# Patient Record
Sex: FEMALE | Race: OTHER | HISPANIC OR LATINO | URBAN - METROPOLITAN AREA
[De-identification: names, ages, dates, MRNs, and addresses within clinical notes are randomized per-mention and may not be internally consistent; named-entity substitution may affect disease eponyms.]

---

## 2017-05-18 ENCOUNTER — INPATIENT (INPATIENT)
Facility: HOSPITAL | Age: 72
LOS: 0 days | Discharge: HOME | DRG: 392 | End: 2017-05-19
Attending: INTERNAL MEDICINE | Admitting: INTERNAL MEDICINE
Payer: MEDICAID

## 2017-05-18 VITALS
SYSTOLIC BLOOD PRESSURE: 165 MMHG | DIASTOLIC BLOOD PRESSURE: 73 MMHG | OXYGEN SATURATION: 99 % | RESPIRATION RATE: 18 BRPM | WEIGHT: 162.04 LBS | HEART RATE: 79 BPM | HEIGHT: 62 IN | TEMPERATURE: 99 F

## 2017-05-18 DIAGNOSIS — R07.89 OTHER CHEST PAIN: ICD-10-CM

## 2017-05-18 DIAGNOSIS — K21.9 GASTRO-ESOPHAGEAL REFLUX DISEASE WITHOUT ESOPHAGITIS: ICD-10-CM

## 2017-05-18 DIAGNOSIS — J98.11 ATELECTASIS: ICD-10-CM

## 2017-05-18 DIAGNOSIS — I10 ESSENTIAL (PRIMARY) HYPERTENSION: ICD-10-CM

## 2017-05-18 DIAGNOSIS — Z95.9 PRESENCE OF CARDIAC AND VASCULAR IMPLANT AND GRAFT, UNSPECIFIED: Chronic | ICD-10-CM

## 2017-05-18 DIAGNOSIS — R06.02 SHORTNESS OF BREATH: ICD-10-CM

## 2017-05-18 DIAGNOSIS — I24.9 ACUTE ISCHEMIC HEART DISEASE, UNSPECIFIED: ICD-10-CM

## 2017-05-18 DIAGNOSIS — Z29.9 ENCOUNTER FOR PROPHYLACTIC MEASURES, UNSPECIFIED: ICD-10-CM

## 2017-05-18 DIAGNOSIS — Z98.890 OTHER SPECIFIED POSTPROCEDURAL STATES: Chronic | ICD-10-CM

## 2017-05-18 LAB
ACETONE SERPL-MCNC: NEGATIVE — SIGNIFICANT CHANGE UP
ALBUMIN SERPL ELPH-MCNC: 3.7 G/DL — SIGNIFICANT CHANGE UP (ref 3.5–5)
ALP SERPL-CCNC: 83 U/L — SIGNIFICANT CHANGE UP (ref 40–120)
ALT FLD-CCNC: 22 U/L DA — SIGNIFICANT CHANGE UP (ref 10–60)
ANION GAP SERPL CALC-SCNC: 8 MMOL/L — SIGNIFICANT CHANGE UP (ref 5–17)
APPEARANCE UR: CLEAR — SIGNIFICANT CHANGE UP
APTT BLD: 32.2 SEC — SIGNIFICANT CHANGE UP (ref 27.5–37.4)
AST SERPL-CCNC: 22 U/L — SIGNIFICANT CHANGE UP (ref 10–40)
BACTERIA # UR AUTO: ABNORMAL /HPF
BASOPHILS # BLD AUTO: 0.2 K/UL — SIGNIFICANT CHANGE UP (ref 0–0.2)
BASOPHILS NFR BLD AUTO: 2.5 % — HIGH (ref 0–2)
BILIRUB SERPL-MCNC: 0.3 MG/DL — SIGNIFICANT CHANGE UP (ref 0.2–1.2)
BILIRUB UR-MCNC: NEGATIVE — SIGNIFICANT CHANGE UP
BUN SERPL-MCNC: 15 MG/DL — SIGNIFICANT CHANGE UP (ref 7–18)
CALCIUM SERPL-MCNC: 8.9 MG/DL — SIGNIFICANT CHANGE UP (ref 8.4–10.5)
CHLORIDE SERPL-SCNC: 105 MMOL/L — SIGNIFICANT CHANGE UP (ref 96–108)
CK MB BLD-MCNC: <0.9 % — SIGNIFICANT CHANGE UP (ref 0–3.5)
CK MB BLD-MCNC: <1.2 % — SIGNIFICANT CHANGE UP (ref 0–3.5)
CK MB CFR SERPL CALC: <1 NG/ML — SIGNIFICANT CHANGE UP (ref 0–3.6)
CK MB CFR SERPL CALC: <1 NG/ML — SIGNIFICANT CHANGE UP (ref 0–3.6)
CK SERPL-CCNC: 114 U/L — SIGNIFICANT CHANGE UP (ref 21–215)
CK SERPL-CCNC: 83 U/L — SIGNIFICANT CHANGE UP (ref 21–215)
CO2 SERPL-SCNC: 27 MMOL/L — SIGNIFICANT CHANGE UP (ref 22–31)
COLOR SPEC: YELLOW — SIGNIFICANT CHANGE UP
CREAT SERPL-MCNC: 0.67 MG/DL — SIGNIFICANT CHANGE UP (ref 0.5–1.3)
D DIMER BLD IA.RAPID-MCNC: 220 NG/ML DDU — SIGNIFICANT CHANGE UP
DIFF PNL FLD: ABNORMAL
EOSINOPHIL # BLD AUTO: 0.2 K/UL — SIGNIFICANT CHANGE UP (ref 0–0.5)
EOSINOPHIL NFR BLD AUTO: 2.6 % — SIGNIFICANT CHANGE UP (ref 0–6)
EPI CELLS # UR: ABNORMAL (ref 0–10)
GLUCOSE SERPL-MCNC: 88 MG/DL — SIGNIFICANT CHANGE UP (ref 70–99)
GLUCOSE UR QL: NEGATIVE — SIGNIFICANT CHANGE UP
HCT VFR BLD CALC: 40 % — SIGNIFICANT CHANGE UP (ref 34.5–45)
HGB BLD-MCNC: 13.5 G/DL — SIGNIFICANT CHANGE UP (ref 11.5–15.5)
INR BLD: 0.9 RATIO — SIGNIFICANT CHANGE UP (ref 0.88–1.16)
KETONES UR-MCNC: NEGATIVE — SIGNIFICANT CHANGE UP
LEUKOCYTE ESTERASE UR-ACNC: NEGATIVE — SIGNIFICANT CHANGE UP
LYMPHOCYTES # BLD AUTO: 3.2 K/UL — SIGNIFICANT CHANGE UP (ref 1–3.3)
LYMPHOCYTES # BLD AUTO: 47.7 % — HIGH (ref 13–44)
MAGNESIUM SERPL-MCNC: 2.3 MG/DL — SIGNIFICANT CHANGE UP (ref 1.6–2.6)
MCHC RBC-ENTMCNC: 31 PG — SIGNIFICANT CHANGE UP (ref 27–34)
MCHC RBC-ENTMCNC: 33.7 GM/DL — SIGNIFICANT CHANGE UP (ref 32–36)
MCV RBC AUTO: 91.9 FL — SIGNIFICANT CHANGE UP (ref 80–100)
MONOCYTES # BLD AUTO: 0.6 K/UL — SIGNIFICANT CHANGE UP (ref 0–0.9)
MONOCYTES NFR BLD AUTO: 9.2 % — SIGNIFICANT CHANGE UP (ref 2–14)
NEUTROPHILS # BLD AUTO: 2.5 K/UL — SIGNIFICANT CHANGE UP (ref 1.8–7.4)
NEUTROPHILS NFR BLD AUTO: 38 % — LOW (ref 43–77)
NITRITE UR-MCNC: NEGATIVE — SIGNIFICANT CHANGE UP
NT-PROBNP SERPL-SCNC: 61 PG/ML — SIGNIFICANT CHANGE UP (ref 0–125)
PH UR: 7 — SIGNIFICANT CHANGE UP (ref 5–8)
PLATELET # BLD AUTO: 317 K/UL — SIGNIFICANT CHANGE UP (ref 150–400)
POTASSIUM SERPL-MCNC: 3.9 MMOL/L — SIGNIFICANT CHANGE UP (ref 3.5–5.3)
POTASSIUM SERPL-SCNC: 3.9 MMOL/L — SIGNIFICANT CHANGE UP (ref 3.5–5.3)
PROT SERPL-MCNC: 7.8 G/DL — SIGNIFICANT CHANGE UP (ref 6–8.3)
PROT UR-MCNC: NEGATIVE — SIGNIFICANT CHANGE UP
PROTHROM AB SERPL-ACNC: 9.8 SEC — SIGNIFICANT CHANGE UP (ref 9.8–12.7)
RBC # BLD: 4.35 M/UL — SIGNIFICANT CHANGE UP (ref 3.8–5.2)
RBC # FLD: 12.3 % — SIGNIFICANT CHANGE UP (ref 10.3–14.5)
RBC CASTS # UR COMP ASSIST: ABNORMAL /HPF (ref 0–2)
SODIUM SERPL-SCNC: 140 MMOL/L — SIGNIFICANT CHANGE UP (ref 135–145)
SP GR SPEC: 1 — LOW (ref 1.01–1.02)
TROPONIN I SERPL-MCNC: <0.015 NG/ML — SIGNIFICANT CHANGE UP (ref 0–0.04)
TROPONIN I SERPL-MCNC: <0.015 NG/ML — SIGNIFICANT CHANGE UP (ref 0–0.04)
UROBILINOGEN FLD QL: NEGATIVE — SIGNIFICANT CHANGE UP
WBC # BLD: 6.7 K/UL — SIGNIFICANT CHANGE UP (ref 3.8–10.5)
WBC # FLD AUTO: 6.7 K/UL — SIGNIFICANT CHANGE UP (ref 3.8–10.5)
WBC UR QL: SIGNIFICANT CHANGE UP /HPF (ref 0–5)

## 2017-05-18 PROCEDURE — 71010: CPT | Mod: 26

## 2017-05-18 PROCEDURE — 99285 EMERGENCY DEPT VISIT HI MDM: CPT

## 2017-05-18 RX ORDER — ASPIRIN/CALCIUM CARB/MAGNESIUM 324 MG
81 TABLET ORAL ONCE
Qty: 0 | Refills: 0 | Status: COMPLETED | OUTPATIENT
Start: 2017-05-18 | End: 2017-05-18

## 2017-05-18 RX ORDER — CLOPIDOGREL BISULFATE 75 MG/1
75 TABLET, FILM COATED ORAL DAILY
Qty: 0 | Refills: 0 | Status: DISCONTINUED | OUTPATIENT
Start: 2017-05-18 | End: 2017-05-19

## 2017-05-18 RX ORDER — METOPROLOL TARTRATE 50 MG
12.5 TABLET ORAL
Qty: 0 | Refills: 0 | Status: DISCONTINUED | OUTPATIENT
Start: 2017-05-18 | End: 2017-05-18

## 2017-05-18 RX ORDER — ASPIRIN/CALCIUM CARB/MAGNESIUM 324 MG
81 TABLET ORAL DAILY
Qty: 0 | Refills: 0 | Status: DISCONTINUED | OUTPATIENT
Start: 2017-05-19 | End: 2017-05-19

## 2017-05-18 RX ORDER — ATORVASTATIN CALCIUM 80 MG/1
40 TABLET, FILM COATED ORAL AT BEDTIME
Qty: 0 | Refills: 0 | Status: DISCONTINUED | OUTPATIENT
Start: 2017-05-18 | End: 2017-05-19

## 2017-05-18 RX ORDER — ENOXAPARIN SODIUM 100 MG/ML
40 INJECTION SUBCUTANEOUS DAILY
Qty: 0 | Refills: 0 | Status: DISCONTINUED | OUTPATIENT
Start: 2017-05-18 | End: 2017-05-19

## 2017-05-18 RX ORDER — LOSARTAN POTASSIUM 100 MG/1
75 TABLET, FILM COATED ORAL DAILY
Qty: 0 | Refills: 0 | Status: DISCONTINUED | OUTPATIENT
Start: 2017-05-18 | End: 2017-05-19

## 2017-05-18 RX ORDER — SODIUM CHLORIDE 9 MG/ML
1000 INJECTION INTRAMUSCULAR; INTRAVENOUS; SUBCUTANEOUS
Qty: 0 | Refills: 0 | Status: DISCONTINUED | OUTPATIENT
Start: 2017-05-18 | End: 2017-05-18

## 2017-05-18 RX ADMIN — Medication 81 MILLIGRAM(S): at 10:54

## 2017-05-18 RX ADMIN — ATORVASTATIN CALCIUM 40 MILLIGRAM(S): 80 TABLET, FILM COATED ORAL at 21:26

## 2017-05-18 RX ADMIN — LOSARTAN POTASSIUM 75 MILLIGRAM(S): 100 TABLET, FILM COATED ORAL at 14:03

## 2017-05-18 RX ADMIN — SODIUM CHLORIDE 125 MILLILITER(S): 9 INJECTION INTRAMUSCULAR; INTRAVENOUS; SUBCUTANEOUS at 10:54

## 2017-05-18 NOTE — H&P ADULT - NEGATIVE CARDIOVASCULAR SYMPTOMS
no claudication/no orthopnea/no paroxysmal nocturnal dyspnea/no peripheral edema/no palpitations/no dyspnea on exertion

## 2017-05-18 NOTE — H&P ADULT - ATTENDING COMMENTS
71 y/o F from home lives with her daughter, walks w/o assistance. PMH of HTN, HLD GERD, diverticulitis and CAD (s/p stent x 1 in LAD). Pt was BIB daughter for difficulty breathing since yesterday with associated nausea, dizziness and mild focal chest discomfort over to mid sternum. Pt has not taken any medications for Sx relief. Pt was enrolled a study trial (with Tacrolimus Eluting Bioresorbable stent placement) 1 year ago, but changed cardiologist recently and is supposed to follow up with new cardiologist next week. Pt was advised to avoid pantoprazole as currently taking Plavix for 1 year. Pt complains of occasional GERD and admitted not compliant with diet recently with 5-10 lbs weight gain over the past month. Pt denies fever, chills, cough, palpitations, vomiting, recent travel, recent sick contacts, or any other complaints. Last stress test 2016 with no signs of ischemia, Echocardiogram 2/2016 showing normal EF and GIDD.     EGD: 1 year ago showing gastritis. Colonoscopy: 1 year ago showing diverticulosis  Family Hx: Brothers HTN.     pt seen in bed, a+o x3, nad, vitals stable, physical exam reveals no focal motor deficit, clear lungs, regular s1s2, abd soft nd nt bs+, ext no edema. labs and diagnostic test result reviewed.    assessment   --- chest pain,  sob, r/o acs, atelectasis, h/o HTN, HLD GERD, diverticulitis and CAD (s/p stent x 1 in LAD)    plan  --  adm to tele, acs protocol, increase cozaar, add lopid, cont preadmit home meds, gi and dvt profilaxis  cbc, bmp, mg, phos, lipid, tsh, ce q8 x3    echo    cardio cons  pulm cons

## 2017-05-18 NOTE — H&P ADULT - PMH
Coronary artery disease involving native coronary artery of native heart without angina pectoris    HTN (hypertension)

## 2017-05-18 NOTE — H&P ADULT - NEGATIVE NEUROLOGICAL SYMPTOMS
no headache/no weakness/no generalized seizures/no facial palsy/no loss of consciousness/no hemiparesis/no loss of sensation/no syncope/no tremors/no paresthesias/no focal seizures/no transient paralysis/no vertigo/no difficulty walking/no confusion

## 2017-05-18 NOTE — H&P ADULT - HISTORY OF PRESENT ILLNESS
73 y/o F from home lives with her daughter, walks w/o assistance. PMH of HTN, HLD GERD, diverticulitis and CAD (s/p stent x 1 in LAD). Pt was BIB daughter for difficulty breathing since yesterday with associated nausea, dizziness and mild focal chest discomfort over to mid sternum. Pt has not taken any medications for Sx relief. Pt was enrolled a study trial (with Tacrolimus Eluting Bioresorbable stent placement) 1 year ago, but changed cardiologist recently and is supposed to follow up with new cardiologist next week. Pt was advised to avoid pantoprazole as currently taking Plavix for 1 year. Pt complains of occasional GERD and admitted not compliant with diet recently with 5-10 lbs weight gain over the past month. Pt denies fever, chills, cough, palpitations, vomiting, recent travel, recent sick contacts, or any other complaints. Last stress test 2016 with no signs of ischemia, Echocardiogram 2/2016 showing normal EF and GIDD.     EGD: 1 year ago showing gastritis. Colonoscopy: 1 year ago showing diverticulosis  Family Hx: Brothers HTN.

## 2017-05-18 NOTE — ED PROVIDER NOTE - OBJECTIVE STATEMENT
71 y/o female with PMHx of HTN and PSHx of Abdominoplasty presents to the ED BIB daughter for difficulty breathing x yesterday with associated nausea, dizziness and mild focal CP to mid sternum. Pt has not taken any medications for Sx relief. Pt denies fever, chills, cough, palpitations, vomiting, recent travel, recent sick contacts, or any other complaints. NKDA. PMD: Dr. Syed 71 y/o female with PMHx of HTN and PSHx of Coronary Stent and Abdominoplasty presents to the ED BIB daughter for difficulty breathing x yesterday with associated nausea, dizziness and mild focal CP to mid sternum. Pt has not taken any medications for Sx relief. Pt denies fever, chills, cough, palpitations, vomiting, recent travel, recent sick contacts, or any other complaints. NKDA. PMD: Dr. Syed

## 2017-05-18 NOTE — H&P ADULT - GASTROINTESTINAL DETAILS
no guarding/no rigidity/nontender/no distention/no masses palpable/no rebound tenderness/normal/no organomegaly/bowel sounds normal/soft/no bruit

## 2017-05-18 NOTE — H&P ADULT - NEGATIVE ENMT SYMPTOMS
no abnormal taste sensation/no ear pain/no nose bleeds/no nasal obstruction/no post-nasal discharge/no gum bleeding/no sinus symptoms/no throat pain/no hearing difficulty/no nasal discharge/no dry mouth/no dysphagia/no vertigo/no nasal congestion/no tinnitus/no recurrent cold sores

## 2017-05-18 NOTE — H&P ADULT - PROBLEM SELECTOR PLAN 2
pt has hx of GERD dx 1 year ago   currently diet controlled only as pt was told by cardiologist not to take PPI as pt taking Plavix  c/w Maalox PRN for dyspepsia

## 2017-05-18 NOTE — H&P ADULT - NEGATIVE SKIN SYMPTOMS
no dryness/no rash/no brittle nails/no tumor/no change in size/color of mole/no hair loss/no pitted nails/no itching

## 2017-05-18 NOTE — ED PROVIDER NOTE - PROGRESS NOTE DETAILS
pt's daughter later claims pt has h/o cardiac stent placement, since with sob, will admit pt, d/w Dr. Wynn

## 2017-05-18 NOTE — H&P ADULT - RS GEN PE MLT RESP DETAILS PC
respirations non-labored/no subcutaneous emphysema/good air movement/breath sounds equal/no chest wall tenderness/clear to auscultation bilaterally/no rales/normal/no rhonchi/no intercostal retractions/no wheezes/airway patent

## 2017-05-18 NOTE — ED PROVIDER NOTE - NS ED MD SCRIBE ATTENDING SCRIBE SECTIONS
PAST MEDICAL/SURGICAL/SOCIAL HISTORY/HIV/PHYSICAL EXAM/HISTORY OF PRESENT ILLNESS/VITAL SIGNS( Pullset)/REVIEW OF SYSTEMS/DISPOSITION

## 2017-05-18 NOTE — CONSULT NOTE ADULT - SUBJECTIVE AND OBJECTIVE BOX
PULMONARY CONSULT NOTE      RAHUL MOLINA  [id number]    Patient is a 72y old  Female who presents with a chief complaint of     HISTORY OF PRESENT ILLNESS:    MEDICATIONS  (STANDING):  sodium chloride 0.9%. 1000milliLiter(s) IV Continuous <Continuous>  atorvastatin 40milliGRAM(s) Oral at bedtime  metoprolol 12.5milliGRAM(s) Oral two times a day      MEDICATIONS  (PRN):      Allergies    No Known Allergies    Intolerances        PAST MEDICAL & SURGICAL HISTORY:  HTN (hypertension)  Status post arterial stent  H/O abdominoplasty      FAMILY HISTORY:  No pertinent family history in first degree relatives      SOCIAL HISTORY  Smoking History:     REVIEW OF SYSTEMS:    CONSTITUTIONAL:  No fevers, chills, sweats    HEENT:  Eyes:  No diplopia or blurred vision. ENT:  No earache, sore throat or runny nose.    CARDIOVASCULAR:  No pressure, squeezing, tightness, or heaviness about the chest; no palpitations.    RESPIRATORY:  Per HPI    GASTROINTESTINAL:  No abdominal pain, nausea, vomiting or diarrhea.    GENITOURINARY:  No dysuria, frequency or urgency.    NEUROLOGIC:  No paresthesias, fasciculations, seizures or weakness.    PSYCHIATRIC:  No disorder of thought or mood.    Vital Signs Last 24 Hrs  T(C): 37, Max: 37 (05-18 @ 10:05)  T(F): 98.6, Max: 98.6 (05-18 @ 10:05)  HR: 79 (79 - 79)  BP: 165/73 (165/73 - 165/73)  BP(mean): --  RR: 18 (18 - 18)  SpO2: 99% (99% - 99%)  I&O's Detail      PHYSICAL EXAMINATION:    GENERAL: The patient is a well-developed, well-nourished _____in no apparent distress.     HEENT: Head is normocephalic and atraumatic. Extraocular muscles are intact. Mucous membranes are moist.     NECK: Supple.     LUNGS: Clear to auscultation without wheezing, rales, or rhonchi. Respirations unlabored    HEART: Regular rate and rhythm without murmur.    ABDOMEN: Soft, nontender, and nondistended.  No hepatosplenomegaly is noted.    EXTREMITIES: Without any cyanosis, clubbing, rash, lesions or edema.    NEUROLOGIC: Grossly intact.      LABS:                        13.5   6.7   )-----------( 317      ( 18 May 2017 10:53 )             40.0         140  |  105  |  15  ----------------------------<  88  3.9   |  27  |  0.67    Ca    8.9      18 May 2017 10:53  Mg     2.3         TPro  7.8  /  Alb  3.7  /  TBili  0.3  /  DBili  x   /  AST  22  /  ALT  22  /  AlkPhos  83      PT/INR - ( 18 May 2017 10:53 )   PT: 9.8 sec;   INR: 0.90 ratio         PTT - ( 18 May 2017 10:53 )  PTT:32.2 sec  Urinalysis Basic - ( 18 May 2017 10:53 )    Color: Yellow / Appearance: Clear / S.005 / pH: x  Gluc: x / Ketone: Negative  / Bili: Negative / Urobili: Negative   Blood: x / Protein: Negative / Nitrite: Negative   Leuk Esterase: Negative / RBC: 5-10 /HPF / WBC 0-2 /HPF   Sq Epi: x / Non Sq Epi: Few / Bacteria: Trace /HPF        CARDIAC MARKERS ( 18 May 2017 10:53 )  <0.015 ng/mL / x     / 83 U/L / x     / <1.0 ng/mL        Serum Pro-Brain Natriuretic Peptide: 61 pg/mL ( @ 10:53)          MICROBIOLOGY:    RADIOLOGY & ADDITIONAL STUDIES:    CXR:  IMPRESSION:  Atelectasis right lung base. No consolidation or pleural effusion.    Heart size cannot be accurately assessed in this projection.    ELADIO BUCHANAN M.D., ATTENDING RADIOLOGIST  This document has been electronically signed. May 18 2017 11:39AM PULMONARY CONSULT NOTE      RAHUL MOLINA  [id number]    Patient is a 72y old  Female who presents with a chief complaint of chest tightnesss associated with sob. Denies cough or wheezing.  Denies Asthma History.    HISTORY OF PRESENT ILLNESS:    MEDICATIONS  (STANDING):  sodium chloride 0.9%. 1000milliLiter(s) IV Continuous <Continuous>  atorvastatin 40milliGRAM(s) Oral at bedtime  metoprolol 12.5milliGRAM(s) Oral two times a day      MEDICATIONS  (PRN):      Allergies    No Known Allergies    Intolerances        PAST MEDICAL & SURGICAL HISTORY:  HTN (hypertension)  Hyperlipidemia  Status post arterial stent  H/O abdominoplasty      FAMILY HISTORY:  No pertinent family history in first degree relatives      SOCIAL HISTORY  Smoking History: former smoker    REVIEW OF SYSTEMS:    CONSTITUTIONAL:  No fevers, chills, sweats    HEENT:  Eyes:  No diplopia or blurred vision. ENT:  No earache, sore throat or runny nose.    CARDIOVASCULAR:  chest tightness    RESPIRATORY: sob    GASTROINTESTINAL:  No abdominal pain, nausea, vomiting or diarrhea.    GENITOURINARY:  No dysuria, frequency or urgency.    NEUROLOGIC:  No paresthesias, fasciculations, seizures or weakness.    PSYCHIATRIC:  No disorder of thought or mood.    Vital Signs Last 24 Hrs  T(C): 37, Max: 37 (05-18 @ 10:05)  T(F): 98.6, Max: 98.6 (05-18 @ 10:05)  HR: 79 (79 - 79)  BP: 165/73 (165/73 - 165/73)  BP(mean): --  RR: 18 (18 - 18)  SpO2: 99% (99% - 99%)  I&O's Detail      PHYSICAL EXAMINATION:    GENERAL: The patient is a well-developed, well-nourished _____in no apparent distress.     HEENT: Head is normocephalic and atraumatic. Extraocular muscles are intact. Mucous membranes are moist.     NECK: Supple.     LUNGS: Clear to auscultation without wheezing, rales, or rhonchi. Respirations unlabored    HEART: Regular rate and rhythm without murmur.    ABDOMEN: Soft, nontender, and nondistended.  No hepatosplenomegaly is noted.    EXTREMITIES: Without any cyanosis, clubbing, rash, lesions or edema.    NEUROLOGIC: Grossly intact.      LABS:                        13.5   6.7   )-----------( 317      ( 18 May 2017 10:53 )             40.0         140  |  105  |  15  ----------------------------<  88  3.9   |  27  |  0.67    Ca    8.9      18 May 2017 10:53  Mg     2.3         TPro  7.8  /  Alb  3.7  /  TBili  0.3  /  DBili  x   /  AST  22  /  ALT  22  /  AlkPhos  83      PT/INR - ( 18 May 2017 10:53 )   PT: 9.8 sec;   INR: 0.90 ratio         PTT - ( 18 May 2017 10:53 )  PTT:32.2 sec  Urinalysis Basic - ( 18 May 2017 10:53 )    Color: Yellow / Appearance: Clear / S.005 / pH: x  Gluc: x / Ketone: Negative  / Bili: Negative / Urobili: Negative   Blood: x / Protein: Negative / Nitrite: Negative   Leuk Esterase: Negative / RBC: 5-10 /HPF / WBC 0-2 /HPF   Sq Epi: x / Non Sq Epi: Few / Bacteria: Trace /HPF        CARDIAC MARKERS ( 18 May 2017 10:53 )  <0.015 ng/mL / x     / 83 U/L / x     / <1.0 ng/mL        Serum Pro-Brain Natriuretic Peptide: 61 pg/mL ( @ 10:53)          MICROBIOLOGY:    RADIOLOGY & ADDITIONAL STUDIES:    CXR:  IMPRESSION:  Atelectasis right lung base. No consolidation or pleural effusion.    Heart size cannot be accurately assessed in this projection.    ELADIO BUCHANAN M.D., ATTENDING RADIOLOGIST  This document has been electronically signed. May 18 2017 11:39AM

## 2017-05-18 NOTE — H&P ADULT - NEGATIVE MUSCULOSKELETAL SYMPTOMS
no arthralgia/no neck pain/no arthritis/no muscle cramps/no stiffness/no joint swelling/no muscle weakness/no myalgia

## 2017-05-18 NOTE — H&P ADULT - NEGATIVE GENERAL SYMPTOMS
no fever/no chills/no sweating/no weight loss/no polydipsia/no polyphagia/no anorexia/no malaise/no fatigue/no polyuria

## 2017-05-18 NOTE — H&P ADULT - ASSESSMENT
73 y/o F from home lives with her daughter, walks w/o assistance. PMH of HTN, HLD GERD, diverticulitis and CAD (s/p stent x 1 in LAD). Pt was BIB daughter for difficulty breathing since yesterday with associated nausea, dizziness and mild focal chest discomfort over to mid sternum. Pt has not taken any medications for Sx relief. Pt was enrolled a study trial (with Tacrolimus Eluting Bioresorbable stent placement) 1 year ago, but changed cardiologist recently and is supposed to follow up with new cardiologist next week. Pt was advised to avoid pantoprazole as currently taking Plavix for 1 year. Pt complains of occasional GERD and admitted not compliant with diet recently with 5-10 lbs weight gain over the past month.

## 2017-05-18 NOTE — H&P ADULT - PROBLEM SELECTOR PLAN 1
patient presented with atypical chest pain over the past day, with prior hx of CAD  on admission chest pain not reproducible   currently taking ASA, Plavix and statin, not taking BB as per pt had side effect of depression (outopt cardiologist discontinued)  c/w ASA, statin and plavix  f/u serial cardiac enzymes  f/u echocardiogram   Cardiology consulted Dr. Ortega

## 2017-05-18 NOTE — H&P ADULT - NEGATIVE GASTROINTESTINAL SYMPTOMS
no change in bowel habits/no flatulence/no nausea/no steatorrhea/no constipation/no hematochezia/no hiccoughs/no diarrhea/no vomiting/no jaundice/no melena

## 2017-05-18 NOTE — H&P ADULT - NEUROLOGICAL DETAILS
alert and oriented x 3/responds to verbal commands/responds to pain/deep reflexes intact/sensation intact/no spontaneous movement/normal strength/cranial nerves intact/superficial reflexes intact

## 2017-05-19 VITALS
OXYGEN SATURATION: 97 % | DIASTOLIC BLOOD PRESSURE: 76 MMHG | SYSTOLIC BLOOD PRESSURE: 145 MMHG | RESPIRATION RATE: 17 BRPM | HEART RATE: 62 BPM | TEMPERATURE: 99 F

## 2017-05-19 LAB
ALBUMIN SERPL ELPH-MCNC: 3.4 G/DL — LOW (ref 3.5–5)
ALP SERPL-CCNC: 73 U/L — SIGNIFICANT CHANGE UP (ref 40–120)
ALT FLD-CCNC: 20 U/L DA — SIGNIFICANT CHANGE UP (ref 10–60)
ANION GAP SERPL CALC-SCNC: 13 MMOL/L — SIGNIFICANT CHANGE UP (ref 5–17)
AST SERPL-CCNC: 20 U/L — SIGNIFICANT CHANGE UP (ref 10–40)
BASOPHILS # BLD AUTO: 0.1 K/UL — SIGNIFICANT CHANGE UP (ref 0–0.2)
BASOPHILS NFR BLD AUTO: 2.3 % — HIGH (ref 0–2)
BILIRUB SERPL-MCNC: 0.4 MG/DL — SIGNIFICANT CHANGE UP (ref 0.2–1.2)
BUN SERPL-MCNC: 13 MG/DL — SIGNIFICANT CHANGE UP (ref 7–18)
CALCIUM SERPL-MCNC: 8.9 MG/DL — SIGNIFICANT CHANGE UP (ref 8.4–10.5)
CHLORIDE SERPL-SCNC: 108 MMOL/L — SIGNIFICANT CHANGE UP (ref 96–108)
CHOLEST SERPL-MCNC: 217 MG/DL — HIGH (ref 10–199)
CK MB BLD-MCNC: <1.2 % — SIGNIFICANT CHANGE UP (ref 0–3.5)
CK MB CFR SERPL CALC: <1 NG/ML — SIGNIFICANT CHANGE UP (ref 0–3.6)
CK SERPL-CCNC: 83 U/L — SIGNIFICANT CHANGE UP (ref 21–215)
CO2 SERPL-SCNC: 22 MMOL/L — SIGNIFICANT CHANGE UP (ref 22–31)
CREAT SERPL-MCNC: 0.61 MG/DL — SIGNIFICANT CHANGE UP (ref 0.5–1.3)
CULTURE RESULTS: NO GROWTH — SIGNIFICANT CHANGE UP
EOSINOPHIL # BLD AUTO: 0.2 K/UL — SIGNIFICANT CHANGE UP (ref 0–0.5)
EOSINOPHIL NFR BLD AUTO: 3.6 % — SIGNIFICANT CHANGE UP (ref 0–6)
GLUCOSE SERPL-MCNC: 100 MG/DL — HIGH (ref 70–99)
HBA1C BLD-MCNC: 5.9 % — HIGH (ref 4–5.6)
HCT VFR BLD CALC: 40.3 % — SIGNIFICANT CHANGE UP (ref 34.5–45)
HDLC SERPL-MCNC: 44 MG/DL — SIGNIFICANT CHANGE UP (ref 40–125)
HGB BLD-MCNC: 13.2 G/DL — SIGNIFICANT CHANGE UP (ref 11.5–15.5)
LIPID PNL WITH DIRECT LDL SERPL: 125 MG/DL — SIGNIFICANT CHANGE UP
LYMPHOCYTES # BLD AUTO: 2.6 K/UL — SIGNIFICANT CHANGE UP (ref 1–3.3)
LYMPHOCYTES # BLD AUTO: 42.7 % — SIGNIFICANT CHANGE UP (ref 13–44)
MAGNESIUM SERPL-MCNC: 2.2 MG/DL — SIGNIFICANT CHANGE UP (ref 1.6–2.6)
MCHC RBC-ENTMCNC: 29.8 PG — SIGNIFICANT CHANGE UP (ref 27–34)
MCHC RBC-ENTMCNC: 32.8 GM/DL — SIGNIFICANT CHANGE UP (ref 32–36)
MCV RBC AUTO: 91 FL — SIGNIFICANT CHANGE UP (ref 80–100)
MONOCYTES # BLD AUTO: 0.5 K/UL — SIGNIFICANT CHANGE UP (ref 0–0.9)
MONOCYTES NFR BLD AUTO: 8.5 % — SIGNIFICANT CHANGE UP (ref 2–14)
NEUTROPHILS # BLD AUTO: 2.6 K/UL — SIGNIFICANT CHANGE UP (ref 1.8–7.4)
NEUTROPHILS NFR BLD AUTO: 42.9 % — LOW (ref 43–77)
PHOSPHATE SERPL-MCNC: 3.6 MG/DL — SIGNIFICANT CHANGE UP (ref 2.5–4.5)
PLATELET # BLD AUTO: 309 K/UL — SIGNIFICANT CHANGE UP (ref 150–400)
POTASSIUM SERPL-MCNC: 4 MMOL/L — SIGNIFICANT CHANGE UP (ref 3.5–5.3)
POTASSIUM SERPL-SCNC: 4 MMOL/L — SIGNIFICANT CHANGE UP (ref 3.5–5.3)
PROT SERPL-MCNC: 7.5 G/DL — SIGNIFICANT CHANGE UP (ref 6–8.3)
RBC # BLD: 4.42 M/UL — SIGNIFICANT CHANGE UP (ref 3.8–5.2)
RBC # FLD: 11.9 % — SIGNIFICANT CHANGE UP (ref 10.3–14.5)
SODIUM SERPL-SCNC: 143 MMOL/L — SIGNIFICANT CHANGE UP (ref 135–145)
SPECIMEN SOURCE: SIGNIFICANT CHANGE UP
TOTAL CHOLESTEROL/HDL RATIO MEASUREMENT: 4.9 RATIO — SIGNIFICANT CHANGE UP (ref 3.3–7.1)
TRIGL SERPL-MCNC: 240 MG/DL — HIGH (ref 10–149)
TROPONIN I SERPL-MCNC: <0.015 NG/ML — SIGNIFICANT CHANGE UP (ref 0–0.04)
WBC # BLD: 6.1 K/UL — SIGNIFICANT CHANGE UP (ref 3.8–10.5)
WBC # FLD AUTO: 6.1 K/UL — SIGNIFICANT CHANGE UP (ref 3.8–10.5)

## 2017-05-19 RX ORDER — ATORVASTATIN CALCIUM 80 MG/1
1 TABLET, FILM COATED ORAL
Qty: 30 | Refills: 0
Start: 2017-05-19 | End: 2017-06-18

## 2017-05-19 RX ORDER — ACETAMINOPHEN 500 MG
650 TABLET ORAL ONCE
Qty: 0 | Refills: 0 | Status: COMPLETED | OUTPATIENT
Start: 2017-05-19 | End: 2017-05-19

## 2017-05-19 RX ORDER — METOPROLOL TARTRATE 50 MG
12.5 TABLET ORAL DAILY
Qty: 0 | Refills: 0 | Status: DISCONTINUED | OUTPATIENT
Start: 2017-05-19 | End: 2017-05-19

## 2017-05-19 RX ORDER — ATORVASTATIN CALCIUM 80 MG/1
10 TABLET, FILM COATED ORAL AT BEDTIME
Qty: 0 | Refills: 0 | Status: DISCONTINUED | OUTPATIENT
Start: 2017-05-19 | End: 2017-05-19

## 2017-05-19 RX ORDER — GEMFIBROZIL 600 MG
1 TABLET ORAL
Qty: 60 | Refills: 0
Start: 2017-05-19 | End: 2017-06-18

## 2017-05-19 RX ORDER — ASPIRIN/CALCIUM CARB/MAGNESIUM 324 MG
1 TABLET ORAL
Qty: 0 | Refills: 0 | COMMUNITY

## 2017-05-19 RX ORDER — LOSARTAN POTASSIUM 100 MG/1
1.5 TABLET, FILM COATED ORAL
Qty: 0 | Refills: 0 | COMMUNITY

## 2017-05-19 RX ORDER — CLOPIDOGREL BISULFATE 75 MG/1
1 TABLET, FILM COATED ORAL
Qty: 30 | Refills: 0
Start: 2017-05-19 | End: 2017-06-18

## 2017-05-19 RX ORDER — GEMFIBROZIL 600 MG
600 TABLET ORAL
Qty: 0 | Refills: 0 | Status: DISCONTINUED | OUTPATIENT
Start: 2017-05-19 | End: 2017-05-19

## 2017-05-19 RX ORDER — METOPROLOL TARTRATE 50 MG
0.5 TABLET ORAL
Qty: 15 | Refills: 0
Start: 2017-05-19 | End: 2017-06-18

## 2017-05-19 RX ORDER — LOSARTAN POTASSIUM 100 MG/1
1 TABLET, FILM COATED ORAL
Qty: 60 | Refills: 0
Start: 2017-05-19 | End: 2017-06-18

## 2017-05-19 RX ORDER — CLOPIDOGREL BISULFATE 75 MG/1
1 TABLET, FILM COATED ORAL
Qty: 0 | Refills: 0 | COMMUNITY

## 2017-05-19 RX ORDER — ASPIRIN/CALCIUM CARB/MAGNESIUM 324 MG
1 TABLET ORAL
Qty: 30 | Refills: 0 | OUTPATIENT
Start: 2017-05-19 | End: 2017-06-18

## 2017-05-19 RX ORDER — LOSARTAN POTASSIUM 100 MG/1
50 TABLET, FILM COATED ORAL
Qty: 0 | Refills: 0 | Status: DISCONTINUED | OUTPATIENT
Start: 2017-05-20 | End: 2017-05-19

## 2017-05-19 RX ADMIN — Medication 650 MILLIGRAM(S): at 08:08

## 2017-05-19 RX ADMIN — Medication 650 MILLIGRAM(S): at 07:41

## 2017-05-19 RX ADMIN — ENOXAPARIN SODIUM 40 MILLIGRAM(S): 100 INJECTION SUBCUTANEOUS at 11:57

## 2017-05-19 RX ADMIN — CLOPIDOGREL BISULFATE 75 MILLIGRAM(S): 75 TABLET, FILM COATED ORAL at 11:57

## 2017-05-19 RX ADMIN — Medication 81 MILLIGRAM(S): at 11:57

## 2017-05-19 RX ADMIN — LOSARTAN POTASSIUM 75 MILLIGRAM(S): 100 TABLET, FILM COATED ORAL at 06:14

## 2017-05-19 NOTE — DISCHARGE NOTE ADULT - MEDICATION SUMMARY - MEDICATIONS TO STOP TAKING
I will STOP taking the medications listed below when I get home from the hospital:    Cipro 500 mg oral tablet  -- 1 tab(s) by mouth 2 times a day  -- Avoid prolonged or excessive exposure to direct and/or artificial sunlight while taking this medication.  Check with your doctor before becoming pregnant.  Do not take dairy products, antacids, or iron preparations within one hour of this medication.  Finish all this medication unless otherwise directed by prescriber.  Medication should be taken with plenty of water.    Flagyl 500 mg oral tablet  -- 1 tab(s) by mouth every 8 hours  -- Do not drink alcoholic beverages when taking this medication.  Finish all this medication unless otherwise directed by prescriber.  May discolor urine or feces.

## 2017-05-19 NOTE — PROGRESS NOTE ADULT - PROBLEM SELECTOR PLAN 2
pt has hx of GERD dx 1 year ago   currently diet controlled only as pt was told by cardiologist not to take PPI as pt taking Plavix  c/w Maalox PRN for dyspepsia
Incentive spirometry  Bronchodilators prn  [facility] as outpatient

## 2017-05-19 NOTE — CONSULT NOTE ADULT - ASSESSMENT
Pt with Hx as above with Chest pain,SOB.  1.Echocardiogram.  2.Stress test-r/o ischemia.  3.Add lopid 600mg bid  4. Increase cozaar 50mg bid.  5.Continue rest of cardiac medication.  6. GI and DVT prophylaxis. Pt with Hx as above with Chest pain,SOB, bradycardia.  1.Echocardiogram.  2.Stress test-r/o ischemia.  3.Add lopid 600mg bid and Toprol XL 12.5mg qd  4. Increase cozaar 50mg bid.  5. Tele monitoring for HR.  6. GI and DVT prophylaxis.

## 2017-05-19 NOTE — PROGRESS NOTE ADULT - PROBLEM SELECTOR PLAN 3
Blood pressure is elevated  -Losartan changed to 50mg BID from 75mg Daily
Oxygen supp prn  Cont meds  Cardiac follow up

## 2017-05-19 NOTE — DISCHARGE NOTE ADULT - CARE PLAN
Principal Discharge DX:	ACS (acute coronary syndrome)  Goal:	Resolved  Instructions for follow-up, activity and diet:	You were evaluated for cardiac chest pain. All blood work and testing indicated that your heart is working well currently and you do not have any coronary artery disease. Your chest pain is likely secondary to gastric reflux disease and you should take Maalox after meals to relieve the discomfort  Secondary Diagnosis:	Essential hypertension  Goal:	Control blood pressure  Instructions for follow-up, activity and diet:	Your blood pressure was poorly controlled on admission and your medications were adjusted to improve blood pressure control. Your losartan is changed from 75mg once a day to 50mg twice a day and you were started on Toprol to assist in blood pressure control. Follow up with your PCP for blood pressure check in 1 week and evaluate medication regimen for possible improvements and optimizations.  Secondary Diagnosis:	Gastroesophageal reflux disease, esophagitis presence not specified  Goal:	Control gastric reflux  Instructions for follow-up, activity and diet:	Please take Maalox after every meal to help with gastric reflux to prevent chest pain in the future. Please tell your PCP about your new diagnosis and ask for any further medication recommendations.

## 2017-05-19 NOTE — DISCHARGE NOTE ADULT - HOSPITAL COURSE
71 y/o F from home lives with her daughter, walks w/o assistance. PMH of HTN, HLD GERD, diverticulitis and CAD (s/p stent x 1 in LAD). Pt was BIB daughter for difficulty breathing since yesterday with associated nausea, dizziness and mild focal chest discomfort over to mid sternum. Pt has not taken any medications for Sx relief. Pt was enrolled a study trial (with Tacrolimus Eluting Bioresorbable stent placement) 1 year ago, but changed cardiologist recently and is supposed to follow up with new cardiologist next week. Pt was advised to avoid pantoprazole as currently taking Plavix for 1 year. Pt complains of occasional GERD and admitted not compliant with diet recently with 5-10 lbs weight gain over the past month. Pt denies fever, chills, cough, palpitations, vomiting, recent travel, recent sick contacts, or any other complaints. Last stress test 2016 with no signs of ischemia, Echocardiogram 2/2016 showing normal EF and GIDD.     EGD: 1 year ago showing gastritis. Colonoscopy: 1 year ago showing diverticulosis  Family Hx: Brothers HTN. (18 May 2017 14:10)    In the hospital, patient was evaluated for cardiac chest pain, rule out acute coronary syndrome. Patient was tested and had negative cardiac enzymes x3 repeats, non diabetic as per A1c testing and normal TSH and LDL was high. Patient was tested for cardiac function with an echocardiogram which showed normal ejection fraction and a nuclear stress test which showed no evidence of coronary artery ischemia. Patient's chest pain improved in house with maalox. Patient is medically stable and will be discharged with outpatient follow up. Plan discussed and agreed with Dr. Butterfield

## 2017-05-19 NOTE — PROGRESS NOTE ADULT - PROBLEM SELECTOR PLAN 1
Chest pain has improved overnight  -Cardiac enzymes are negative x3  -, A1c 5.9, TSH pending  -C/w ASA, lipitor, plavix, metoprolol  -F/u Echocardiogram/NST
Cont meds  Cardiac follow up  Echo

## 2017-05-19 NOTE — DISCHARGE NOTE ADULT - MEDICATION SUMMARY - MEDICATIONS TO CHANGE
I will SWITCH the dose or number of times a day I take the medications listed below when I get home from the hospital:    losartan 50 mg oral tablet  -- 1.5 tab(s) by mouth once a day

## 2017-05-19 NOTE — PROGRESS NOTE ADULT - SUBJECTIVE AND OBJECTIVE BOX
Patient is a 72y old  Female who presents with a chief complaint of chest pain  HPI:  71 y/o F from home lives with her daughter, walks w/o assistance. PMH of HTN, HLD GERD, diverticulitis and CAD (s/p stent x 1 in LAD). Pt was BIB daughter for difficulty breathing since yesterday with associated nausea, dizziness and mild focal chest discomfort over to mid sternum. Pt has not taken any medications for Sx relief. Pt was enrolled a study trial (with Tacrolimus Eluting Bioresorbable stent placement) 1 year ago, but changed cardiologist recently and is supposed to follow up with new cardiologist next week. Pt was advised to avoid pantoprazole as currently taking Plavix for 1 year. Pt complains of occasional GERD and admitted not compliant with diet recently with 5-10 lbs weight gain over the past month. Pt denies fever, chills, cough, palpitations, vomiting, recent travel, recent sick contacts, or any other complaints. Last stress test 2016 with no signs of ischemia, Echocardiogram 2/2016 showing normal EF and GIDD.     EGD: 1 year ago showing gastritis. Colonoscopy: 1 year ago showing diverticulosis  Family Hx: Brothers HTN. (18 May 2017 14:10)    Overnight Events: chest pain improved, had nausea this morning which self-resolved    MEDICATIONS  (STANDING):  aspirin enteric coated 81milliGRAM(s) Oral daily  clopidogrel Tablet 75milliGRAM(s) Oral daily  enoxaparin Injectable 40milliGRAM(s) SubCutaneous daily  atorvastatin 10milliGRAM(s) Oral at bedtime  gemfibrozil 600milliGRAM(s) Oral two times a day before meals  metoprolol succinate ER 12.5milliGRAM(s) Oral daily    MEDICATIONS  (PRN):  aluminum hydroxide/magnesium hydroxide/simethicone Suspension 30milliLiter(s) Oral every 6 hours PRN Dyspepsia    Vital Signs Last 24 Hrs  T(C): 36.6, Max: 36.7 (05-18 @ 13:47)  T(F): 97.8, Max: 98.1 (05-18 @ 15:54)  HR: 66 (56 - 66)  BP: 141/63 (139/67 - 171/69)  BP(mean): --  RR: 18 (16 - 18)  SpO2: 98% (97% - 100%)    General: WN/WD NAD  Neurology: A&Ox3  Respiratory: CTA B/L, no wheezes, no rhonchi, no rales  CV: RRR, S1S2, no murmur  Abdominal: Soft, NT, ND no palpable mass, bowel sounds positive  MSK: No edema, + peripheral pulses      Labs:                        13.2   6.1   )-----------( 309      ( 19 May 2017 06:05 )             40.3     05-19    143  |  108  |  13  ----------------------------<  100<H>  4.0   |  22  |  0.61    Ca    8.9      19 May 2017 06:05  Phos  3.6     05-19  Mg     2.2     05-19    TPro  7.5  /  Alb  3.4<L>  /  TBili  0.4  /  DBili  x   /  AST  20  /  ALT  20  /  AlkPhos  73  05-19 05-19 Chol 217<H>  HDL 44 Trig 240<H>

## 2017-05-19 NOTE — DISCHARGE NOTE ADULT - PLAN OF CARE
Resolved You were evaluated for cardiac chest pain. All blood work and testing indicated that your heart is working well currently and you do not have any coronary artery disease. Your chest pain is likely secondary to gastric reflux disease and you should take Maalox after meals to relieve the discomfort Control blood pressure Your blood pressure was poorly controlled on admission and your medications were adjusted to improve blood pressure control. Your losartan is changed from 75mg once a day to 50mg twice a day and you were started on Toprol to assist in blood pressure control. Follow up with your PCP for blood pressure check in 1 week and evaluate medication regimen for possible improvements and optimizations. Control gastric reflux Please take Maalox after every meal to help with gastric reflux to prevent chest pain in the future. Please tell your PCP about your new diagnosis and ask for any further medication recommendations.

## 2017-05-19 NOTE — PROGRESS NOTE ADULT - ASSESSMENT
71 y/o F from home lives with her daughter, walks w/o assistance. PMH of HTN, HLD GERD, diverticulitis and CAD (s/p stent x 1 in LAD). Pt was BIB daughter for difficulty breathing since yesterday with associated nausea, dizziness and mild focal chest discomfort over to mid sternum. Pt has not taken any medications for Sx relief. Pt was enrolled a study trial (with Tacrolimus Eluting Bioresorbable stent placement) 1 year ago, but changed cardiologist recently and is supposed to follow up with new cardiologist next week. Pt was advised to avoid pantoprazole as currently taking Plavix for 1 year. Pt complains of occasional GERD and admitted not compliant with diet recently with 5-10 lbs weight gain over the past month.

## 2017-05-19 NOTE — PROGRESS NOTE ADULT - SUBJECTIVE AND OBJECTIVE BOX
Patient is a 72y old  Female who presents with a chief complaint of     INTERVAL HPI/OVERNIGHT EVENTS:      VITAL SIGNS:  T(F): 97.8  HR: 66  BP: 141/63  RR: 18  SpO2: 98%  Wt(kg): --  I&O's Detail          REVIEW OF SYSTEMS:    CONSTITUTIONAL:  No fevers, chills, sweats    HEENT:  Eyes:  No diplopia or blurred vision. ENT:  No earache, sore throat or runny nose.    CARDIOVASCULAR:  No pressure, squeezing, tightness, or heaviness about the chest; no palpitations.    RESPIRATORY:  Per HPI    GASTROINTESTINAL:  No abdominal pain, nausea, vomiting or diarrhea.    GENITOURINARY:  No dysuria, frequency or urgency.    NEUROLOGIC:  No paresthesias, fasciculations, seizures or weakness.    PSYCHIATRIC:  No disorder of thought or mood.      PHYSICAL EXAM:    Constitutional: Well developed and nourished  Eyes:Perrla  ENMT: normal  Neck:supple  Respiratory: good air entry  Cardiovascular: S1 S2 regular  Gastrointestinal: Soft, Non tender  Extremities: No edema  Vascular:normal  Neurological:Awake, alert,Ox3  Musculoskeletal:Normal      MEDICATIONS  (STANDING):  aspirin enteric coated 81milliGRAM(s) Oral daily  clopidogrel Tablet 75milliGRAM(s) Oral daily  enoxaparin Injectable 40milliGRAM(s) SubCutaneous daily  atorvastatin 10milliGRAM(s) Oral at bedtime  gemfibrozil 600milliGRAM(s) Oral two times a day before meals  metoprolol succinate ER 12.5milliGRAM(s) Oral daily    MEDICATIONS  (PRN):  aluminum hydroxide/magnesium hydroxide/simethicone Suspension 30milliLiter(s) Oral every 6 hours PRN Dyspepsia      Allergies    No Known Allergies    Intolerances        LABS:                        13.2   6.1   )-----------( 309      ( 19 May 2017 06:05 )             40.3     05-19    143  |  108  |  13  ----------------------------<  100<H>  4.0   |  22  |  0.61    Ca    8.9      19 May 2017 06:05  Phos  3.6     05-19  Mg     2.2     05-19    TPro  7.5  /  Alb  3.4<L>  /  TBili  0.4  /  DBili  x   /  AST  20  /  ALT  20  /  AlkPhos  73  05-19    PT/INR - ( 18 May 2017 10:53 )   PT: 9.8 sec;   INR: 0.90 ratio         PTT - ( 18 May 2017 10:53 )  PTT:32.2 sec  Urinalysis Basic - ( 18 May 2017 10:53 )    Color: Yellow / Appearance: Clear / S.005 / pH: x  Gluc: x / Ketone: Negative  / Bili: Negative / Urobili: Negative   Blood: x / Protein: Negative / Nitrite: Negative   Leuk Esterase: Negative / RBC: 5-10 /HPF / WBC 0-2 /HPF   Sq Epi: x / Non Sq Epi: Few / Bacteria: Trace /HPF        CARDIAC MARKERS ( 19 May 2017 00:10 )  <0.015 ng/mL / x     / 83 U/L / x     / <1.0 ng/mL  CARDIAC MARKERS ( 18 May 2017 18:01 )  <0.015 ng/mL / x     / 114 U/L / x     / <1.0 ng/mL  CARDIAC MARKERS ( 18 May 2017 10:53 )  <0.015 ng/mL / x     / 83 U/L / x     / <1.0 ng/mL      CAPILLARY BLOOD GLUCOSE    pro-bnp -- 05-18 @ 14:45     d-dimer 220  05-18 @ 14:45  pro-bnp 61 05-18 @ 10:53     d-dimer --  05-18 @ 10:53      RADIOLOGY & ADDITIONAL TESTS:    CXR:  IMPRESSION:  Atelectasis right lung base. No consolidation or pleural effusion.  Heart size cannot be accurately assessed in this projection.    Ct scan chest:    ekg;    echo: Patient is a 72y old  Female who presents with a chief complaint of chest pain and sob.  Awake, alert, lying in bed in NAD.    INTERVAL HPI/OVERNIGHT EVENTS:      VITAL SIGNS:  T(F): 97.8  HR: 66  BP: 141/63  RR: 18  SpO2: 98%  Wt(kg): --  I&O's Detail          REVIEW OF SYSTEMS:    CONSTITUTIONAL:  No fevers, chills, sweats    HEENT:  Eyes:  No diplopia or blurred vision. ENT:  No earache, sore throat or runny nose.    CARDIOVASCULAR:  No pressure, squeezing, tightness, or heaviness about the chest; no palpitations.    RESPIRATORY:  Per HPI    GASTROINTESTINAL:  No abdominal pain, nausea, vomiting or diarrhea.    GENITOURINARY:  No dysuria, frequency or urgency.    NEUROLOGIC:  No paresthesias, fasciculations, seizures or weakness.    PSYCHIATRIC:  No disorder of thought or mood.      PHYSICAL EXAM:    Constitutional: Well developed and nourished  Eyes:Perrla  ENMT: normal  Neck:supple  Respiratory: good air entry  Cardiovascular: S1 S2 regular  Gastrointestinal: Soft, Non tender  Extremities: No edema  Vascular:normal  Neurological:Awake, alert,Ox3  Musculoskeletal:Normal      MEDICATIONS  (STANDING):  aspirin enteric coated 81milliGRAM(s) Oral daily  clopidogrel Tablet 75milliGRAM(s) Oral daily  enoxaparin Injectable 40milliGRAM(s) SubCutaneous daily  atorvastatin 10milliGRAM(s) Oral at bedtime  gemfibrozil 600milliGRAM(s) Oral two times a day before meals  metoprolol succinate ER 12.5milliGRAM(s) Oral daily    MEDICATIONS  (PRN):  aluminum hydroxide/magnesium hydroxide/simethicone Suspension 30milliLiter(s) Oral every 6 hours PRN Dyspepsia      Allergies    No Known Allergies    Intolerances        LABS:                        13.2   6.1   )-----------( 309      ( 19 May 2017 06:05 )             40.3     05-19    143  |  108  |  13  ----------------------------<  100<H>  4.0   |  22  |  0.61    Ca    8.9      19 May 2017 06:05  Phos  3.6     05-19  Mg     2.2     05-19    TPro  7.5  /  Alb  3.4<L>  /  TBili  0.4  /  DBili  x   /  AST  20  /  ALT  20  /  AlkPhos  73  05-19    PT/INR - ( 18 May 2017 10:53 )   PT: 9.8 sec;   INR: 0.90 ratio         PTT - ( 18 May 2017 10:53 )  PTT:32.2 sec  Urinalysis Basic - ( 18 May 2017 10:53 )    Color: Yellow / Appearance: Clear / S.005 / pH: x  Gluc: x / Ketone: Negative  / Bili: Negative / Urobili: Negative   Blood: x / Protein: Negative / Nitrite: Negative   Leuk Esterase: Negative / RBC: 5-10 /HPF / WBC 0-2 /HPF   Sq Epi: x / Non Sq Epi: Few / Bacteria: Trace /HPF        CARDIAC MARKERS ( 19 May 2017 00:10 )  <0.015 ng/mL / x     / 83 U/L / x     / <1.0 ng/mL  CARDIAC MARKERS ( 18 May 2017 18:01 )  <0.015 ng/mL / x     / 114 U/L / x     / <1.0 ng/mL  CARDIAC MARKERS ( 18 May 2017 10:53 )  <0.015 ng/mL / x     / 83 U/L / x     / <1.0 ng/mL      CAPILLARY BLOOD GLUCOSE    pro-bnp -- 05-18 @ 14:45     d-dimer 220  05-18 @ 14:45  pro-bnp 61 05-18 @ 10:53     d-dimer --  05-18 @ 10:53      RADIOLOGY & ADDITIONAL TESTS:    CXR:  IMPRESSION:  Atelectasis right lung base. No consolidation or pleural effusion.  Heart size cannot be accurately assessed in this projection.    Ct scan chest:    ekg;    echo:

## 2017-05-19 NOTE — DISCHARGE NOTE ADULT - MEDICATION SUMMARY - MEDICATIONS TO TAKE
I will START or STAY ON the medications listed below when I get home from the hospital:    aspirin 81 mg oral tablet  -- 1 tab(s) by mouth once a day  -- Indication: For Coronary artery disease involving native coronary artery of native heart without angina pectoris    losartan 50 mg oral tablet  -- 1 tab(s) by mouth 2 times a day  -- Indication: For Hypertension    aluminum hydroxide-magnesium hydroxide 200 mg-200 mg/5 mL oral suspension  -- 30 milliliter(s) by mouth every 6 hours, As needed, Dyspepsia  -- Indication: For GERD    atorvastatin 10 mg oral tablet  -- 1 tab(s) by mouth once a day (at bedtime)  -- Indication: For Coronary artery disease involving native coronary artery of native heart without angina pectoris    gemfibrozil 600 mg oral tablet  -- 1 tab(s) by mouth 2 times a day (before meals)  -- Indication: For Coronary artery disease involving native coronary artery of native heart without angina pectoris    Plavix 75 mg oral tablet  -- 1 tab(s) by mouth once a day  -- Indication: For Coronary artery disease involving native coronary artery of native heart without angina pectoris    metoprolol succinate 25 mg oral tablet, extended release  -- 0.5 tab(s) by mouth once a day  -- Indication: For Hypertension I will START or STAY ON the medications listed below when I get home from the hospital:    aspirin 81 mg oral tablet  -- 1 tab(s) by mouth once a day  -- Indication: For Coronary artery disease involving native coronary artery of native heart without angina pectoris    losartan 50 mg oral tablet  -- 1 tab(s) by mouth 2 times a day  -- Indication: For HTN (hypertension)    aluminum hydroxide-magnesium hydroxide 200 mg-200 mg/5 mL oral suspension  -- 30 milliliter(s) by mouth every 6 hours, As needed, Dyspepsia  -- Indication: For Gastroesophageal reflux disease, esophagitis presence not specified    atorvastatin 10 mg oral tablet  -- 1 tab(s) by mouth once a day (at bedtime)  -- Indication: For Coronary artery disease involving native coronary artery of native heart without angina pectoris    gemfibrozil 600 mg oral tablet  -- 1 tab(s) by mouth 2 times a day (before meals)  -- Indication: For Coronary artery disease involving native coronary artery of native heart without angina pectoris    Plavix 75 mg oral tablet  -- 1 tab(s) by mouth once a day  -- Indication: For Coronary artery disease involving native coronary artery of native heart without angina pectoris    metoprolol succinate 25 mg oral tablet, extended release  -- 0.5 tab(s) by mouth once a day  -- Indication: For HTN (hypertension)

## 2017-05-19 NOTE — CONSULT NOTE ADULT - SUBJECTIVE AND OBJECTIVE BOX
CHIEF COMPLAINT:Patient is a 72y old  Female who presents with a chief complaint of Chest pain.    HPI:  71 y/o Female with PMH of HTN, HLD GERD, diverticulitis, CAD (s/p stent x 1 in LAD) who came to ER for difficulty breathing since yesterday with associated nausea, dizziness and mild focal chest discomfort over to mid sternum. Pt has not taken any medications for Sx relief. Pt was enrolled a study trial (with Tacrolimus Eluting Bioresorbable stent placement) 1 year ago, but changed cardiologist recently.       PAST MEDICAL & SURGICAL HISTORY:  Coronary artery disease involving native coronary artery of native heart without angina pectoris  HTN (hypertension)  Status post arterial stent  H/O abdominoplasty      MEDICATIONS  (STANDING):  aspirin enteric coated 81milliGRAM(s) Oral daily  atorvastatin 40milliGRAM(s) Oral at bedtime  losartan 75milliGRAM(s) Oral daily  clopidogrel Tablet 75milliGRAM(s) Oral daily  enoxaparin Injectable 40milliGRAM(s) SubCutaneous daily    MEDICATIONS  (PRN):  aluminum hydroxide/magnesium hydroxide/simethicone Suspension 30milliLiter(s) Oral every 6 hours PRN Dyspepsia      FAMILY HISTORY:  No pertinent family history in first degree relatives      SOCIAL HISTORY:    [X ] Non-smoker    [X ] Denies Alcohol    Allergies    No Known Allergies        	    REVIEW OF SYSTEMS:  CONSTITUTIONAL: No fever, weight loss, or fatigue  EYES: No eye pain, visual disturbances, or discharge  ENT:  No difficulty hearing, tinnitus, vertigo; No sinus or throat pain  NECK: No pain or stiffness  RESPIRATORY: No cough, wheezing, chills or hemoptysis;+ Shortness of Breath  CARDIOVASCULAR: + chest pain. No palpitations, passing out, dizziness, or leg swelling  GASTROINTESTINAL:+ abdominal or epigastric pain. + nausea, vomiting; No diarrhea or constipation. No melena or hematochezia.  GENITOURINARY: No dysuria, frequency, hematuria, or incontinence  NEUROLOGICAL: No headaches, memory loss, loss of strength, numbness, or tremors  SKIN: No itching, burning, rashes, or lesions   LYMPH Nodes: No enlarged glands  ENDOCRINE: No heat or cold intolerance; No hair loss  MUSCULOSKELETAL: No joint pain or swelling; No muscle, back, or extremity pain  PSYCHIATRIC: No depression, anxiety, mood swings, or difficulty sleeping  HEME/LYMPH: No easy bruising, or bleeding gums  ALLERGY AND IMMUNOLOGIC: No hives or eczema	      PHYSICAL EXAM:  T(C): 36.6, Max: 37 (05-18 @ 10:05)  HR: 66 (56 - 79)  BP: 141/63 (139/67 - 171/69)  RR: 18 (16 - 18)  SpO2: 98% (97% - 100%)    Appearance: Normal	  HEENT:   Normal oral mucosa, PERRL, EOMI	  Lymphatic: No lymphadenopathy  Cardiovascular: Normal S1 S2, No JVD, No murmurs, No edema  Respiratory: Lungs clear to auscultation	  Psychiatry: A & O x 3, Mood & affect appropriate  Gastrointestinal:  Soft, Non-tender, + BS	  Skin: No rashes, No ecchymoses, No cyanosis	  Neurologic: Non-focal  Extremities: Normal range of motion, No clubbing, cyanosis or edema  Vascular: Peripheral pulses palpable 2+ bilaterally    TELEMETRY: NSR	    ECG:  	NSR with T wave inversion lateral leads      	  LABS:	 	    CARDIAC MARKERS:  CARDIAC MARKERS ( 19 May 2017 00:10 )  <0.015 ng/mL / x     / 83 U/L / x     / <1.0 ng/mL  CARDIAC MARKERS ( 18 May 2017 18:01 )  <0.015 ng/mL / x     / 114 U/L / x     / <1.0 ng/mL  CARDIAC MARKERS ( 18 May 2017 10:53 )  <0.015 ng/mL / x     / 83 U/L / x     / <1.0 ng/mL                          13.2   6.1   )-----------( 309      ( 19 May 2017 06:05 )             40.3     05-19    143  |  108  |  13  ----------------------------<  100<H>  4.0   |  22  |  0.61    Ca    8.9      19 May 2017 06:05  Phos  3.6     05-19  Mg     2.2     05-19    TPro  7.5  /  Alb  3.4<L>  /  TBili  0.4  /  DBili  x   /  AST  20  /  ALT  20  /  AlkPhos  73  05-19    proBNP: Serum Pro-Brain Natriuretic Peptide: 61 pg/mL (05-18 @ 10:53)    Lipid Profile: Cholesterol 217    HDL 44

## 2017-05-22 DIAGNOSIS — R00.1 BRADYCARDIA, UNSPECIFIED: ICD-10-CM

## 2017-05-22 DIAGNOSIS — K21.9 GASTRO-ESOPHAGEAL REFLUX DISEASE WITHOUT ESOPHAGITIS: ICD-10-CM

## 2017-05-22 DIAGNOSIS — J98.11 ATELECTASIS: ICD-10-CM

## 2017-05-22 DIAGNOSIS — K57.90 DIVERTICULOSIS OF INTESTINE, PART UNSPECIFIED, WITHOUT PERFORATION OR ABSCESS WITHOUT BLEEDING: ICD-10-CM

## 2017-05-22 DIAGNOSIS — R07.89 OTHER CHEST PAIN: ICD-10-CM

## 2017-05-22 DIAGNOSIS — Z87.891 PERSONAL HISTORY OF NICOTINE DEPENDENCE: ICD-10-CM

## 2017-05-22 DIAGNOSIS — I25.10 ATHEROSCLEROTIC HEART DISEASE OF NATIVE CORONARY ARTERY WITHOUT ANGINA PECTORIS: ICD-10-CM

## 2017-05-22 DIAGNOSIS — Z82.49 FAMILY HISTORY OF ISCHEMIC HEART DISEASE AND OTHER DISEASES OF THE CIRCULATORY SYSTEM: ICD-10-CM

## 2017-05-22 DIAGNOSIS — Z95.5 PRESENCE OF CORONARY ANGIOPLASTY IMPLANT AND GRAFT: ICD-10-CM

## 2017-05-22 DIAGNOSIS — E78.5 HYPERLIPIDEMIA, UNSPECIFIED: ICD-10-CM

## 2017-05-22 DIAGNOSIS — I44.4 LEFT ANTERIOR FASCICULAR BLOCK: ICD-10-CM

## 2017-05-22 DIAGNOSIS — Z79.02 LONG TERM (CURRENT) USE OF ANTITHROMBOTICS/ANTIPLATELETS: ICD-10-CM

## 2017-05-22 DIAGNOSIS — I10 ESSENTIAL (PRIMARY) HYPERTENSION: ICD-10-CM

## 2017-05-22 DIAGNOSIS — Z91.11 PATIENT'S NONCOMPLIANCE WITH DIETARY REGIMEN: ICD-10-CM

## 2017-05-22 DIAGNOSIS — Z79.82 LONG TERM (CURRENT) USE OF ASPIRIN: ICD-10-CM

## 2017-12-15 PROCEDURE — A9502: CPT

## 2017-12-15 PROCEDURE — 81001 URINALYSIS AUTO W/SCOPE: CPT

## 2017-12-15 PROCEDURE — 84100 ASSAY OF PHOSPHORUS: CPT

## 2017-12-15 PROCEDURE — 85379 FIBRIN DEGRADATION QUANT: CPT

## 2017-12-15 PROCEDURE — 93306 TTE W/DOPPLER COMPLETE: CPT

## 2017-12-15 PROCEDURE — 83735 ASSAY OF MAGNESIUM: CPT

## 2017-12-15 PROCEDURE — 71045 X-RAY EXAM CHEST 1 VIEW: CPT

## 2017-12-15 PROCEDURE — 85610 PROTHROMBIN TIME: CPT

## 2017-12-15 PROCEDURE — 82009 KETONE BODYS QUAL: CPT

## 2017-12-15 PROCEDURE — 93017 CV STRESS TEST TRACING ONLY: CPT

## 2017-12-15 PROCEDURE — 84484 ASSAY OF TROPONIN QUANT: CPT

## 2017-12-15 PROCEDURE — 93005 ELECTROCARDIOGRAM TRACING: CPT

## 2017-12-15 PROCEDURE — 80053 COMPREHEN METABOLIC PANEL: CPT

## 2017-12-15 PROCEDURE — 82553 CREATINE MB FRACTION: CPT

## 2017-12-15 PROCEDURE — 87086 URINE CULTURE/COLONY COUNT: CPT

## 2017-12-15 PROCEDURE — 83880 ASSAY OF NATRIURETIC PEPTIDE: CPT

## 2017-12-15 PROCEDURE — 83036 HEMOGLOBIN GLYCOSYLATED A1C: CPT

## 2017-12-15 PROCEDURE — 99285 EMERGENCY DEPT VISIT HI MDM: CPT | Mod: 25

## 2017-12-15 PROCEDURE — 85730 THROMBOPLASTIN TIME PARTIAL: CPT

## 2017-12-15 PROCEDURE — 78452 HT MUSCLE IMAGE SPECT MULT: CPT

## 2017-12-15 PROCEDURE — 80061 LIPID PANEL: CPT

## 2017-12-15 PROCEDURE — G0378: CPT

## 2017-12-15 PROCEDURE — 85027 COMPLETE CBC AUTOMATED: CPT

## 2017-12-15 PROCEDURE — 82550 ASSAY OF CK (CPK): CPT

## 2019-05-25 ENCOUNTER — EMERGENCY (EMERGENCY)
Facility: HOSPITAL | Age: 74
LOS: 1 days | Discharge: HOME | End: 2019-05-25
Attending: EMERGENCY MEDICINE
Payer: MEDICAID

## 2019-05-25 VITALS
DIASTOLIC BLOOD PRESSURE: 71 MMHG | TEMPERATURE: 98 F | RESPIRATION RATE: 19 BRPM | OXYGEN SATURATION: 97 % | SYSTOLIC BLOOD PRESSURE: 164 MMHG | HEART RATE: 59 BPM

## 2019-05-25 VITALS
SYSTOLIC BLOOD PRESSURE: 190 MMHG | WEIGHT: 160.06 LBS | TEMPERATURE: 97 F | DIASTOLIC BLOOD PRESSURE: 80 MMHG | RESPIRATION RATE: 16 BRPM | HEART RATE: 56 BPM | HEIGHT: 63 IN | OXYGEN SATURATION: 99 %

## 2019-05-25 DIAGNOSIS — Z95.9 PRESENCE OF CARDIAC AND VASCULAR IMPLANT AND GRAFT, UNSPECIFIED: Chronic | ICD-10-CM

## 2019-05-25 DIAGNOSIS — Z98.890 OTHER SPECIFIED POSTPROCEDURAL STATES: Chronic | ICD-10-CM

## 2019-05-25 PROBLEM — I10 ESSENTIAL (PRIMARY) HYPERTENSION: Chronic | Status: ACTIVE | Noted: 2017-05-18

## 2019-05-25 PROBLEM — I25.10 ATHEROSCLEROTIC HEART DISEASE OF NATIVE CORONARY ARTERY WITHOUT ANGINA PECTORIS: Chronic | Status: ACTIVE | Noted: 2017-05-18

## 2019-05-25 LAB
ALBUMIN SERPL ELPH-MCNC: 3.4 G/DL — LOW (ref 3.5–5)
ALP SERPL-CCNC: 77 U/L — SIGNIFICANT CHANGE UP (ref 40–120)
ALT FLD-CCNC: 18 U/L DA — SIGNIFICANT CHANGE UP (ref 10–60)
ANION GAP SERPL CALC-SCNC: 7 MMOL/L — SIGNIFICANT CHANGE UP (ref 5–17)
APTT BLD: 33 SEC — SIGNIFICANT CHANGE UP (ref 27.5–36.3)
AST SERPL-CCNC: 27 U/L — SIGNIFICANT CHANGE UP (ref 10–40)
BASOPHILS # BLD AUTO: 0.06 K/UL — SIGNIFICANT CHANGE UP (ref 0–0.2)
BASOPHILS NFR BLD AUTO: 1.1 % — SIGNIFICANT CHANGE UP (ref 0–2)
BILIRUB SERPL-MCNC: 0.3 MG/DL — SIGNIFICANT CHANGE UP (ref 0.2–1.2)
BUN SERPL-MCNC: 14 MG/DL — SIGNIFICANT CHANGE UP (ref 7–18)
CALCIUM SERPL-MCNC: 8.2 MG/DL — LOW (ref 8.4–10.5)
CHLORIDE SERPL-SCNC: 108 MMOL/L — SIGNIFICANT CHANGE UP (ref 96–108)
CO2 SERPL-SCNC: 25 MMOL/L — SIGNIFICANT CHANGE UP (ref 22–31)
CREAT SERPL-MCNC: 0.68 MG/DL — SIGNIFICANT CHANGE UP (ref 0.5–1.3)
D DIMER BLD IA.RAPID-MCNC: 736 NG/ML DDU — HIGH
EOSINOPHIL # BLD AUTO: 0.15 K/UL — SIGNIFICANT CHANGE UP (ref 0–0.5)
EOSINOPHIL NFR BLD AUTO: 2.6 % — SIGNIFICANT CHANGE UP (ref 0–6)
GLUCOSE SERPL-MCNC: 86 MG/DL — SIGNIFICANT CHANGE UP (ref 70–99)
HCT VFR BLD CALC: 40.1 % — SIGNIFICANT CHANGE UP (ref 34.5–45)
HGB BLD-MCNC: 12.6 G/DL — SIGNIFICANT CHANGE UP (ref 11.5–15.5)
IMM GRANULOCYTES NFR BLD AUTO: 0.9 % — SIGNIFICANT CHANGE UP (ref 0–1.5)
INR BLD: 0.89 RATIO — SIGNIFICANT CHANGE UP (ref 0.88–1.16)
LYMPHOCYTES # BLD AUTO: 2.53 K/UL — SIGNIFICANT CHANGE UP (ref 1–3.3)
LYMPHOCYTES # BLD AUTO: 44.4 % — HIGH (ref 13–44)
MCHC RBC-ENTMCNC: 29.6 PG — SIGNIFICANT CHANGE UP (ref 27–34)
MCHC RBC-ENTMCNC: 31.4 GM/DL — LOW (ref 32–36)
MCV RBC AUTO: 94.1 FL — SIGNIFICANT CHANGE UP (ref 80–100)
MONOCYTES # BLD AUTO: 0.49 K/UL — SIGNIFICANT CHANGE UP (ref 0–0.9)
MONOCYTES NFR BLD AUTO: 8.6 % — SIGNIFICANT CHANGE UP (ref 2–14)
NEUTROPHILS # BLD AUTO: 2.42 K/UL — SIGNIFICANT CHANGE UP (ref 1.8–7.4)
NEUTROPHILS NFR BLD AUTO: 42.4 % — LOW (ref 43–77)
NRBC # BLD: 0 /100 WBCS — SIGNIFICANT CHANGE UP (ref 0–0)
PLATELET # BLD AUTO: 274 K/UL — SIGNIFICANT CHANGE UP (ref 150–400)
POTASSIUM SERPL-MCNC: 4.4 MMOL/L — SIGNIFICANT CHANGE UP (ref 3.5–5.3)
POTASSIUM SERPL-SCNC: 4.4 MMOL/L — SIGNIFICANT CHANGE UP (ref 3.5–5.3)
PROT SERPL-MCNC: 7.1 G/DL — SIGNIFICANT CHANGE UP (ref 6–8.3)
PROTHROM AB SERPL-ACNC: 9.9 SEC — LOW (ref 10–12.9)
RBC # BLD: 4.26 M/UL — SIGNIFICANT CHANGE UP (ref 3.8–5.2)
RBC # FLD: 13 % — SIGNIFICANT CHANGE UP (ref 10.3–14.5)
SODIUM SERPL-SCNC: 140 MMOL/L — SIGNIFICANT CHANGE UP (ref 135–145)
TROPONIN I SERPL-MCNC: <0.015 NG/ML — SIGNIFICANT CHANGE UP (ref 0–0.04)
WBC # BLD: 5.7 K/UL — SIGNIFICANT CHANGE UP (ref 3.8–10.5)
WBC # FLD AUTO: 5.7 K/UL — SIGNIFICANT CHANGE UP (ref 3.8–10.5)

## 2019-05-25 PROCEDURE — 85610 PROTHROMBIN TIME: CPT

## 2019-05-25 PROCEDURE — 36415 COLL VENOUS BLD VENIPUNCTURE: CPT

## 2019-05-25 PROCEDURE — 84484 ASSAY OF TROPONIN QUANT: CPT

## 2019-05-25 PROCEDURE — 71046 X-RAY EXAM CHEST 2 VIEWS: CPT | Mod: 26

## 2019-05-25 PROCEDURE — 99284 EMERGENCY DEPT VISIT MOD MDM: CPT

## 2019-05-25 PROCEDURE — 85027 COMPLETE CBC AUTOMATED: CPT

## 2019-05-25 PROCEDURE — 96374 THER/PROPH/DIAG INJ IV PUSH: CPT | Mod: XU

## 2019-05-25 PROCEDURE — 93005 ELECTROCARDIOGRAM TRACING: CPT

## 2019-05-25 PROCEDURE — 71275 CT ANGIOGRAPHY CHEST: CPT

## 2019-05-25 PROCEDURE — 71046 X-RAY EXAM CHEST 2 VIEWS: CPT

## 2019-05-25 PROCEDURE — 93010 ELECTROCARDIOGRAM REPORT: CPT

## 2019-05-25 PROCEDURE — 85379 FIBRIN DEGRADATION QUANT: CPT

## 2019-05-25 PROCEDURE — 85730 THROMBOPLASTIN TIME PARTIAL: CPT

## 2019-05-25 PROCEDURE — 71275 CT ANGIOGRAPHY CHEST: CPT | Mod: 26

## 2019-05-25 PROCEDURE — 80053 COMPREHEN METABOLIC PANEL: CPT

## 2019-05-25 RX ORDER — ACETAMINOPHEN 500 MG
1000 TABLET ORAL ONCE
Refills: 0 | Status: COMPLETED | OUTPATIENT
Start: 2019-05-25 | End: 2019-05-25

## 2019-05-25 RX ORDER — SODIUM CHLORIDE 9 MG/ML
1000 INJECTION INTRAMUSCULAR; INTRAVENOUS; SUBCUTANEOUS ONCE
Refills: 0 | Status: COMPLETED | OUTPATIENT
Start: 2019-05-25 | End: 2019-05-25

## 2019-05-25 RX ADMIN — SODIUM CHLORIDE 1000 MILLILITER(S): 9 INJECTION INTRAMUSCULAR; INTRAVENOUS; SUBCUTANEOUS at 10:35

## 2019-05-25 RX ADMIN — Medication 1000 MILLIGRAM(S): at 11:14

## 2019-05-25 RX ADMIN — Medication 400 MILLIGRAM(S): at 10:35

## 2019-05-25 NOTE — ED PROVIDER NOTE - OBJECTIVE STATEMENT
73 y/o F with a PMHx of HTN and a PSHx of post arterial stent, abdominoplasty presents to ED c/o acute onset of sob x last night. Describes the episode of sob as choking sensation with difficulty talking and feeling like she was going to pass out from being unable to breath. Denies N/V, diaphoresis. Pt endorses the episode made her very anxious and has had persistent shortness of breath since that time. In ED, endorses continued anxiousness. Contrast to triage note, pt denies pain of any sort. Does endorse greater leg swelling but has since resolved. : [id number]. NKDA.

## 2019-05-25 NOTE — ED PROVIDER NOTE - PROGRESS NOTE DETAILS
no
Symptoms resolved. CTA negative. Will DC home with close PCP follow up. Discussed care with [place] speaking staff present.

## 2021-08-30 ENCOUNTER — INPATIENT (INPATIENT)
Facility: HOSPITAL | Age: 76
LOS: 2 days | Discharge: HOME | DRG: 69 | End: 2021-09-02
Attending: INTERNAL MEDICINE | Admitting: INTERNAL MEDICINE
Payer: COMMERCIAL

## 2021-08-30 VITALS
WEIGHT: 147.71 LBS | TEMPERATURE: 99 F | OXYGEN SATURATION: 96 % | HEIGHT: 63 IN | DIASTOLIC BLOOD PRESSURE: 79 MMHG | SYSTOLIC BLOOD PRESSURE: 160 MMHG | HEART RATE: 76 BPM | RESPIRATION RATE: 20 BRPM

## 2021-08-30 DIAGNOSIS — R20.2 PARESTHESIA OF SKIN: ICD-10-CM

## 2021-08-30 DIAGNOSIS — I16.0 HYPERTENSIVE URGENCY: ICD-10-CM

## 2021-08-30 DIAGNOSIS — Z98.890 OTHER SPECIFIED POSTPROCEDURAL STATES: Chronic | ICD-10-CM

## 2021-08-30 DIAGNOSIS — Z29.9 ENCOUNTER FOR PROPHYLACTIC MEASURES, UNSPECIFIED: ICD-10-CM

## 2021-08-30 DIAGNOSIS — I25.10 ATHEROSCLEROTIC HEART DISEASE OF NATIVE CORONARY ARTERY WITHOUT ANGINA PECTORIS: ICD-10-CM

## 2021-08-30 DIAGNOSIS — Z95.9 PRESENCE OF CARDIAC AND VASCULAR IMPLANT AND GRAFT, UNSPECIFIED: Chronic | ICD-10-CM

## 2021-08-30 DIAGNOSIS — E78.5 HYPERLIPIDEMIA, UNSPECIFIED: ICD-10-CM

## 2021-08-30 LAB
ALBUMIN SERPL ELPH-MCNC: 3.6 G/DL — SIGNIFICANT CHANGE UP (ref 3.5–5)
ALP SERPL-CCNC: 89 U/L — SIGNIFICANT CHANGE UP (ref 40–120)
ALT FLD-CCNC: 27 U/L DA — SIGNIFICANT CHANGE UP (ref 10–60)
ANION GAP SERPL CALC-SCNC: 6 MMOL/L — SIGNIFICANT CHANGE UP (ref 5–17)
AST SERPL-CCNC: 30 U/L — SIGNIFICANT CHANGE UP (ref 10–40)
BASOPHILS # BLD AUTO: 0.08 K/UL — SIGNIFICANT CHANGE UP (ref 0–0.2)
BASOPHILS NFR BLD AUTO: 1.2 % — SIGNIFICANT CHANGE UP (ref 0–2)
BILIRUB SERPL-MCNC: 0.3 MG/DL — SIGNIFICANT CHANGE UP (ref 0.2–1.2)
BUN SERPL-MCNC: 14 MG/DL — SIGNIFICANT CHANGE UP (ref 7–18)
CALCIUM SERPL-MCNC: 8.7 MG/DL — SIGNIFICANT CHANGE UP (ref 8.4–10.5)
CHLORIDE SERPL-SCNC: 107 MMOL/L — SIGNIFICANT CHANGE UP (ref 96–108)
CO2 SERPL-SCNC: 26 MMOL/L — SIGNIFICANT CHANGE UP (ref 22–31)
CREAT SERPL-MCNC: 0.59 MG/DL — SIGNIFICANT CHANGE UP (ref 0.5–1.3)
EOSINOPHIL # BLD AUTO: 0.14 K/UL — SIGNIFICANT CHANGE UP (ref 0–0.5)
EOSINOPHIL NFR BLD AUTO: 2.1 % — SIGNIFICANT CHANGE UP (ref 0–6)
GLUCOSE SERPL-MCNC: 97 MG/DL — SIGNIFICANT CHANGE UP (ref 70–99)
HCT VFR BLD CALC: 40.7 % — SIGNIFICANT CHANGE UP (ref 34.5–45)
HGB BLD-MCNC: 13.6 G/DL — SIGNIFICANT CHANGE UP (ref 11.5–15.5)
IMM GRANULOCYTES NFR BLD AUTO: 0.7 % — SIGNIFICANT CHANGE UP (ref 0–1.5)
LYMPHOCYTES # BLD AUTO: 3.06 K/UL — SIGNIFICANT CHANGE UP (ref 1–3.3)
LYMPHOCYTES # BLD AUTO: 45.2 % — HIGH (ref 13–44)
MCHC RBC-ENTMCNC: 29.9 PG — SIGNIFICANT CHANGE UP (ref 27–34)
MCHC RBC-ENTMCNC: 33.4 GM/DL — SIGNIFICANT CHANGE UP (ref 32–36)
MCV RBC AUTO: 89.5 FL — SIGNIFICANT CHANGE UP (ref 80–100)
MONOCYTES # BLD AUTO: 0.54 K/UL — SIGNIFICANT CHANGE UP (ref 0–0.9)
MONOCYTES NFR BLD AUTO: 8 % — SIGNIFICANT CHANGE UP (ref 2–14)
NEUTROPHILS # BLD AUTO: 2.9 K/UL — SIGNIFICANT CHANGE UP (ref 1.8–7.4)
NEUTROPHILS NFR BLD AUTO: 42.8 % — LOW (ref 43–77)
NRBC # BLD: 0 /100 WBCS — SIGNIFICANT CHANGE UP (ref 0–0)
PLATELET # BLD AUTO: 343 K/UL — SIGNIFICANT CHANGE UP (ref 150–400)
POTASSIUM SERPL-MCNC: 4.1 MMOL/L — SIGNIFICANT CHANGE UP (ref 3.5–5.3)
POTASSIUM SERPL-SCNC: 4.1 MMOL/L — SIGNIFICANT CHANGE UP (ref 3.5–5.3)
PROT SERPL-MCNC: 8.2 G/DL — SIGNIFICANT CHANGE UP (ref 6–8.3)
RBC # BLD: 4.55 M/UL — SIGNIFICANT CHANGE UP (ref 3.8–5.2)
RBC # FLD: 12.4 % — SIGNIFICANT CHANGE UP (ref 10.3–14.5)
SARS-COV-2 RNA SPEC QL NAA+PROBE: SIGNIFICANT CHANGE UP
SODIUM SERPL-SCNC: 139 MMOL/L — SIGNIFICANT CHANGE UP (ref 135–145)
WBC # BLD: 6.77 K/UL — SIGNIFICANT CHANGE UP (ref 3.8–10.5)
WBC # FLD AUTO: 6.77 K/UL — SIGNIFICANT CHANGE UP (ref 3.8–10.5)

## 2021-08-30 PROCEDURE — 70450 CT HEAD/BRAIN W/O DYE: CPT | Mod: 26,MA

## 2021-08-30 PROCEDURE — 99285 EMERGENCY DEPT VISIT HI MDM: CPT | Mod: CS

## 2021-08-30 RX ORDER — ASPIRIN/CALCIUM CARB/MAGNESIUM 324 MG
162 TABLET ORAL ONCE
Refills: 0 | Status: COMPLETED | OUTPATIENT
Start: 2021-08-30 | End: 2021-08-30

## 2021-08-30 RX ORDER — LOSARTAN POTASSIUM 100 MG/1
100 TABLET, FILM COATED ORAL DAILY
Refills: 0 | Status: DISCONTINUED | OUTPATIENT
Start: 2021-08-30 | End: 2021-09-02

## 2021-08-30 RX ORDER — OXYCODONE AND ACETAMINOPHEN 5; 325 MG/1; MG/1
1 TABLET ORAL ONCE
Refills: 0 | Status: DISCONTINUED | OUTPATIENT
Start: 2021-08-30 | End: 2021-08-30

## 2021-08-30 RX ORDER — HYDRALAZINE HCL 50 MG
5 TABLET ORAL ONCE
Refills: 0 | Status: COMPLETED | OUTPATIENT
Start: 2021-08-30 | End: 2021-08-30

## 2021-08-30 RX ADMIN — Medication 5 MILLIGRAM(S): at 21:41

## 2021-08-30 RX ADMIN — LOSARTAN POTASSIUM 100 MILLIGRAM(S): 100 TABLET, FILM COATED ORAL at 21:44

## 2021-08-30 RX ADMIN — Medication 162 MILLIGRAM(S): at 21:24

## 2021-08-30 NOTE — ED PROVIDER NOTE - NEUROLOGICAL, MLM
Alert and oriented, no focal deficits, no motor or sensory deficits.  Normal coordination/gait/speech/mental status.

## 2021-08-30 NOTE — H&P ADULT - NSICDXPASTMEDICALHX_GEN_ALL_CORE_FT
PAST MEDICAL HISTORY:  Coronary artery disease involving native coronary artery of native heart without angina pectoris     HLD (hyperlipidemia)     HTN (hypertension)

## 2021-08-30 NOTE — H&P ADULT - PROBLEM SELECTOR PLAN 5
IMPROVE VTE Individual Risk Assessment  RISK                                                                Points  [  ] Previous VTE                                                  3  [  ] Thrombophilia                                               2  [  ] Lower limb paralysis                                      2        (unable to hold up >15 seconds)    [  ] Current Cancer                                              2         (within 6 months)  [x  ] Immobilization > 24 hrs                                1  [  ] ICU/CCU stay > 24 hours                              1  [x  ] Age > 60                                                      1  IMPROVE VTE Score _________2, -- for DVT proph  on Xarelto

## 2021-08-30 NOTE — ED ADULT NURSE NOTE - SCORE
Silver Nitrate Text: The wound bed was treated with silver nitrate after the biopsy was performed. Biopsy Type: H and E Additional Anesthesia Volume In Cc (Will Not Render If 0): 0 Detail Level: Detailed Bill [id number] For Specimen Handling/Conveyance To Laboratory?: no Wound Care: No ointment Size Of Lesion In Cm: 1.2 Billing Type: Third-Party Bill Dressing: Band-Aid Lab Facility: [id number] Post-Care Instructions: I reviewed with the patient in detail post-care instructions. Patient is to keep the biopsy site dry overnight, and then wash with soap and water and apply vaseline, aquaphor, or polysporin twice daily until healed. Patient should keep covered when out but may leave open to air when at home. Notification Instructions: Patient will be notified of biopsy results. However, patient instructed to call the office if not contacted within 2 weeks. Lab: [id number] Biopsy Method: 15 blade Was A Bandage Applied: Yes Consent was obtained and risks were reviewed including but not limited to scarring, infection, bleeding, discoloration, incomplete removal, nerve damage and allergy to anesthesia. Type Of Destruction Used: Electrodesiccation Anesthesia Volume In Cc (Will Not Render If 0): 0.3 Anesthesia Type: 1% Xylocaine with epinephrine Depth Of Biopsy: dermis Hemostasis: Zahira's 1

## 2021-08-30 NOTE — H&P ADULT - PROBLEM SELECTOR PLAN 3
Takes Ezetimibe and atorvastatin as per previous records  Will start on atorvastatin   Confirm home meds

## 2021-08-30 NOTE — ED ADULT NURSE NOTE - OBJECTIVE STATEMENT
pt is here for medical evaluation.  As per family member, sending by PMD, high blood pressure, right neck pain, rt arm numbness, denied chest pain or sob, able to walk, denied asymmetric systems,

## 2021-08-30 NOTE — ED PROVIDER NOTE - CLINICAL SUMMARY MEDICAL DECISION MAKING FREE TEXT BOX
75 y/o woman, h/o CAD, HTN, HL, since Saturday 8/28/21 at about 9 pm, developed pain first to right lateral neck and head and right foot, then shortly after developed numbness/tingling and mild weakness to right arm and leg.  No paralysis or severe weakness and has had no difficulty walking--labs, EKG, CT head, reassess.  Mild stroke diagnosis considered but Pt is well out of the window to consider tPA/telestroke/code stroke.

## 2021-08-30 NOTE — H&P ADULT - HISTORY OF PRESENT ILLNESS
73 y/o F from home lives with her daughter with PMH of HTN, HLD and CAD (s/p stent x 1 in LAD 5 years ago) on xeralto. Pt was sent from her PCP Dr Wynn for Hypertensive urgency with a blood pressure of 180/88. Pt was at Dr. Wynn's office because 2 days ago she developed a pull aching pain on the right side of her neck which resolved in 3 hours but was followed by a tingling sensation in the right side of her body from head to toe. The sensation worsened yesterday but has now completely resolved. She denies any weakness or numbness during this time. She denies difficulty ambulating, dysarthria, vision changes, headache, LOC or any other complaints. She starts all her symptoms have completely resolved and is back at her usual state of health.  71 y/o F from home lives with her daughter with PMH of HTN, HLD and CAD (s/p stent x 1 in LAD 5 years ago) on xeralto. Pt was sent from her PCP Dr Wynn for Hypertensive urgency with a blood pressure of 180/88. Pt was at Dr. Wynn's office because 2 days ago she developed a pull aching pain on the right side of her neck which resolved in 3 hours but was followed by a tingling sensation in the right side of her body from head to toe. The sensation worsened yesterday but has now completely resolved. She denies any weakness or numbness during this time. She denies difficulty ambulating, dysarthria, vision changes, headache, LOC or any other complaints. She states all her symptoms have completely resolved and is back at her usual state of health.     Patient has a right facial droop, however, daughter present at bedside states it has always been like that

## 2021-08-30 NOTE — H&P ADULT - ASSESSMENT
73 y/o F from home lives with her daughter with PMH of HTN, HLD and CAD (s/p stent x 1 in LAD 5 years ago) on xeralto. Pt was sent from her PCP Dr Wynn for Hypertensive urgency with a blood pressure of 180/88.    Confirm home meds in the am

## 2021-08-30 NOTE — ED ADULT NURSE NOTE - NSIMPLEMENTINTERV_GEN_ALL_ED
Implemented All Universal Safety Interventions:  [place] to call system. Call bell, personal items and telephone within reach. Instruct patient to call for assistance. Room bathroom lighting operational. Non-slip footwear when patient is off stretcher. Physically safe environment: no spills, clutter or unnecessary equipment. Stretcher in lowest position, wheels locked, appropriate side rails in place.

## 2021-08-30 NOTE — ED PROVIDER NOTE - OBJECTIVE STATEMENT
77 y/o woman, h/o CAD, HTN, HL, since Saturday 8/28/21 at about 9 pm, developed pain first to right lateral neck and head and right foot, then shortly after developed numbness/tingling and mild weakness to right arm and leg.  No paralysis or severe weakness and has had no difficulty walking, no dizziness/nausea/vomiting/coordination problems/fever/CP/abd pain.  She saw PMD Dr. Brain Wynn today for these Sx and was sent here for eval.

## 2021-08-30 NOTE — ED ADULT TRIAGE NOTE - RELATIONSHIP TO PATIENT
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 PTA, Pt was receiving HH OT/PT to work on higher level balance and strength in LUE/LLE and had just begun using a SPC for two days. She was dressing/showering independently, yet admits to needing some assist with bathroom mobility intermittently d/t dizziness. Incontinent. She was cleared to drive after a period of restriction following July CVA, yet has not driven since October CVA. HOME SUPPORT: Pt recently moved in with her daughter who assists with bathroom mobility, med management, IADLs and transportation. Prior to October, Pt was completely independent, driving and living on her own. Occupational Therapy Goals  Initiated 12/3/2019  1. Patient will perform grooming tasks standing at the sink with Supervision within 7 day(s). 2.  Patient will perform UB/LB bathing standing at sink with Supervision within 7 day(s). 3.  Patient will perform standing aspects of lower body dressing with Supervision within 7 day(s). 4.  Patient will perform toilet transfers with Supervision using least restrictive device within 7 day(s). 5.  Patient will don/doff protective undergarment with Supervision within 7 day(s). 6.  Patient will perform item retrieval from various heights during ADLs with Supervision within 7 days. Outcome: Progressing Towards Goal   OCCUPATIONAL THERAPY EVALUATION  Patient: Shawanda Layne (13 y.o. female)  Date: 12/3/2019  Primary Diagnosis: Acute CVA (cerebrovascular accident) [facility]) [I63.9]        Precautions:  Fall    ASSESSMENT  Based on the objective data described below, the patient presents with diplopia, very mild LUE strength, decreased static/dynamic standing balance, and decreased activity tolerance.  MRI revealed no acute infarcts, yet severe microvascular ischemic disease. 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 Pt was received supine on arrival with eye patch donned to R eye. She completed bed mobility with Supervision, yet required Min then CGA to stand after x2 attempts and mild LOB. Pt is most limited d/t gait instability and balance deficits with standing ADLs and would benefit from Inpatient Rehab at discharge prior to returning home. She is motivated to participate in therapy and was completely independent prior to recent CVAs. Current Level of Function Impacting Discharge (ADLs/self-care): Min to [address] with transfers; Requires Setup to Min A with ADLs    Functional Outcome Measure: The patient scored Total: 45/100 on the Barthel Index outcome measure which is indicative of 55% impaired ability to care for basic self needs/dependency on others; inferred 55% dependency on others for instrumental ADLs. Other factors to consider for discharge: None     Patient will benefit from skilled therapy intervention to address the above noted impairments. PLAN :  Recommendations and Planned Interventions: self care training, functional mobility training, therapeutic exercise, balance training, therapeutic activities, neuromuscular re-education, patient education, and home safety training    Frequency/Duration: Patient will be followed by occupational therapy 5 times a week to address goals. Recommendation for discharge: (in order for the patient to meet his/her long term goals)  Therapy 3 hours per day 5-7 days per week    This discharge recommendation:  Has been made in collaboration with the attending provider and/or case management    IF patient discharges home will need the following DME: to be determined (TBD)       SUBJECTIVE:   Patient stated It came out of nowhere. ..  I woke up and saw two lamps\"    OBJECTIVE DATA SUMMARY:   HISTORY: Past Medical History:   Diagnosis Date    Depression     Fall at home 2/10/2014    Flashers or floaters of right eye 8/21/2014    High cholesterol     History of stroke     Hypertension     Left acoustic neuroma ([facility] [address]     Stroke ([facility] [address]     2010 and 2019     Past Surgical History:   Procedure Laterality Date    HX TUBAL LIGATION  1981    HX TUMOR REMOVAL  2008    brain tumor       Expanded or extensive additional review of patient history: Depression; Brain tumor removal in 2008    [address]: Private residence  # Steps to Enter: 0  One/Two Story Residence: Two story  # of Interior Steps: 12  Interior Rails: Left  Living Alone: No  Support Systems: Family member(s)  Patient Expects to be Discharged to[de-identified] Private residence  Current DME Used/Available at Home: Cane, straight  Tub or Shower Type: Tub/Shower combination    Hand dominance: Right    EXAMINATION OF PERFORMANCE DEFICITS:  Cognitive/Behavioral Status:  Neurologic State: Alert  Orientation Level: Oriented X4  Cognition: Follows commands             Skin: Intact    Edema: None    Hearing: Auditory  Auditory Impairment: None    Vision/Perceptual:                   Visual Fields: Difficulty detecting stimulus  in left lateral quadrant(R eye occluded w/ eye patch; 50% accuracy)  Diplopia: Yes    Acuity: Within Defined Limits;Able to read clock/calendar on wall without difficulty; Able to read employee name badge without difficulty    Corrective Lenses: Glasses    Range of Motion:  AROM: Within functional limits  PROM: Within functional limits                      Strength:  Strength: Generally decreased, functional(slightly weaker L proximal LE; LUE only mildly decreased)                Coordination:  Coordination: Generally decreased, functional  Fine Motor Skills-Upper: Left Intact; Right Intact    Gross Motor Skills-Upper: Left Intact; Right Intact    Tone & Sensation:  Tone: Normal  Sensation: Intact                      Balance:  Sitting: Intact  Sitting - Static: Good (unsupported)  Sitting - Dynamic: Good (unsupported)  Standing: Impaired; Without support  Standing - Static: Fair;Unsupported  Standing - Dynamic : Fair;Unsupported    Functional Mobility and Transfers for ADLs:  Bed Mobility:  Rolling: Supervision  Supine to Sit: Supervision  Scooting: Supervision    Transfers:  Sit to Stand: Minimum assistance;Contact guard assistance; Other (comment)(mildly unsteady on initial attempt; sat and re-attempted)  Stand to Sit: Contact guard assistance  Bed to Chair: Contact guard assistance  Bathroom Mobility: Contact guard assistance  Toilet Transfer : Contact guard assistance    ADL Assessment:  Feeding: Independent    Oral Facial Hygiene/Grooming: Independent(if seated; CGA at sink if standing)    Bathing: Minimum assistance(if standing d/t decreased dynamic standing balance)    Upper Body Dressing: Setup    Lower Body Dressing: Minimum assistance(with standing aspects)    Toileting: Minimum assistance(with clothing management)                ADL Intervention and task modifications:       Grooming  Position Performed: Standing  Washing Hands: Contact guard assistance                   Lower Body Dressing Assistance  Protective Undergarmet: Minimum assistance(with adjusting fasteners; x1 hand on grab bar for stability)  Socks: Supervision  Leg Crossed Method Used: Yes  Position Performed: Seated edge of bed  Cues: Shemar; Mariia Wilburn  Bladder Hygiene: Supervision(seated on commode)       Functional Measure:  Barthel Index:    Bathin  Bladder: 0  Bowels: 5  Groomin  Dressin  Feeding: 10  Mobility: 5  Stairs: 0  Toilet Use: 5  Transfer (Bed to Chair and Back): 10  Total: 45/100        The Barthel ADL Index: Guidelines  1. The index should be used as a record of what a patient does, not as a record of what a patient could do.   2. The main aim is to establish degree of independence from any help, physical or verbal, however minor and for whatever reason. 3. The need for supervision renders the patient not independent. 4. A patient's performance should be established using the best available evidence. Asking the patient, friends/relatives and nurses are the usual sources, but direct observation and common sense are also important. However direct testing is not needed. 5. Usually the patient's performance over the preceding 24-48 hours is important, but occasionally longer periods will be relevant. 6. Middle categories imply that the patient supplies over 50 per cent of the effort. 7. Use of aids to be independent is allowed. Damaris Fulton., Barthel, DJtW. ([id number]). Functional evaluation: the Barthel Index. [address] [facility] (14)2. Janeth Cleveland bettye TY Feldman, Elige Heimlich., Ashlyn Flores., [place], [address] [facility] (1999). Measuring the change indisability after inpatient rehabilitation; comparison of the responsiveness of the Barthel Index and Functional [place] Measure. Journal of Neurology, Neurosurgery, and Psychiatry, 66(4), 455-101. Marce Ch, N.J.A, LLOYD Martinez, & Mumtaz Harrington M.A. (2004.) Assessment of post-stroke quality of life in cost-effectiveness studies: The usefulness of the Barthel Index and the EuroQoL-5D. Quality of Life Research, 15, 376-49         Occupational Therapy Evaluation Charge Determination   History Examination Decision-Making   LOW Complexity : Brief history review  LOW Complexity : 1-3 performance deficits relating to physical, cognitive , or psychosocial skils that result in activity limitations and / or participation restrictions  LOW Complexity : No comorbidities that affect functional and no verbal or physical assistance needed to complete eval tasks       Based on the above components, the patient evaluation is determined to be of the following complexity level: LOW   Pain Rating:  None    Activity Tolerance:   Good  Please refer to the flowsheet for vital signs taken during this treatment.     After treatment patient left in no apparent distress:    Supine in bed, Call bell within reach, Bed / chair alarm activated, Caregiver / family present, and Side rails x 3    COMMUNICATION/EDUCATION:   The patients plan of care was discussed with: Physical Therapist, Registered Nurse, and Patient. Home safety education was provided and the patient/caregiver indicated understanding., Patient/family have participated as able in goal setting and plan of care. , and Patient/family agree to work toward stated goals and plan of care.     Thank you for this referral.  [facility]L  Time Calculation: 23 mins daughter

## 2021-08-30 NOTE — ED PROVIDER NOTE - PROGRESS NOTE DETAILS
Spoke with Dr. Brain Wynn and he says he wants Pt admitted for further stroke/TIA workup (to Dr. Butterfield).

## 2021-08-30 NOTE — H&P ADULT - ATTENDING COMMENTS
73 y/o F from home lives with her daughter with PMH of HTN, HLD and CAD (s/p stent x 1 in LAD 5 years ago) on xeralto. Pt was sent from her PCP Dr Wynn for Hypertensive urgency with a blood pressure of 180/88. Pt was at Dr. Wynn's office because 2 days ago she developed a pull aching pain on the right side of her neck which resolved in 3 hours but was followed by a tingling sensation in the right side of her body from head to toe. The sensation worsened yesterday but has now completely resolved. She denies any weakness or numbness during this time. She denies difficulty ambulating, dysarthria, vision changes, headache, LOC or any other complaints. She states all her symptoms have completely resolved and is back at her usual state of health.     Patient has a right facial droop, however, daughter present at bedside states it has always been like that        assessment   --- right tingling r/o cva, uncontrolled htn with urgency, r/o acs, h/o HTN, HLD and CAD (s/p stent x 1 in LAD 5 years ago) on xeralto    plan  --  adm to tele, aspirin, statin, cont preadmit home meds, gi and dvt profilaxis  cbc, bmp, mg, phos, lipid, tsh, ce q8 x3    echo  carotid duplex    cardio cons  neuro cons.

## 2021-08-30 NOTE — ED PROVIDER NOTE - CARE PLAN
1 Principal Discharge DX:	Numbness and tingling of right leg  Secondary Diagnosis:	Numbness and tingling of right arm

## 2021-08-30 NOTE — H&P ADULT - NSHPPHYSICALEXAM_GEN_ALL_CORE
LOS:     VITALS:   T(C): 36.1 (08-30-21 @ 22:30), Max: 37.4 (08-30-21 @ 15:57)  HR: 65 (08-30-21 @ 22:30) (65 - 76)  BP: 156/72 (08-30-21 @ 22:30) (156/72 - 202/92)  RR: 16 (08-30-21 @ 22:30) (16 - 20)  SpO2: 99% (08-30-21 @ 22:30) (96% - 99%)    GENERAL: NAD, lying in bed comfortably  HEAD:  Atraumatic, Normocephalic  EYES: EOMI, PERRLA, conjunctiva and sclera clear  ENT: Moist mucous membranes  NECK: Supple, No JVD  CHEST/LUNG: Clear to auscultation bilaterally; No rales, rhonchi, wheezing, or rubs. Unlabored respirations  HEART: Regular rate and rhythm; No murmurs, rubs, or gallops  ABDOMEN: BSx4; Soft, nontender, nondistended  EXTREMITIES:  2+ Peripheral Pulses, brisk capillary refill. No clubbing, cyanosis, or edema  NERVOUS SYSTEM:  A&Ox3, Right upper extremity motor 4/5. Sensory intact throughout and motor 5/5 in LUE and LE  Right side facial droop and decreased forehead creasing   SKIN: No rashes or lesions

## 2021-08-30 NOTE — H&P ADULT - PROBLEM SELECTOR PLAN 2
Pt presented with 2 days of paresthesias   CT head: Age-appropriate involutional change and microvascular ischemic disease age indeterminate. No evidence of large vessel occlusion. No intracranial hemorrhage.  Symptoms resolved now  Rt UE strength 4/5   S/p aspirin in ED  Will start on high dose atorvastatin, aspirin and continue home medication Xarelto  Neurology consulted

## 2021-08-30 NOTE — ED PROVIDER NOTE - NSICDXPASTMEDICALHX_GEN_ALL_CORE_FT
PAST MEDICAL HISTORY:  Coronary artery disease involving native coronary artery of native heart without angina pectoris     HTN (hypertension)

## 2021-08-30 NOTE — H&P ADULT - PROBLEM SELECTOR PLAN 1
/88 at PCP office and 202/92 in the ED  s/p hydralazine 5mg IV and losartan 100mg   EKG with no changes  CT head: :  Age-appropriate involutional change and microvascular ischemic disease age indeterminate. No evidence of large vessel occlusion. No intracranial hemorrhage.  As per previous records takes amlodipine 10mg and losartan 100mg daily  Will start on losartan with parameters  Monitor blood pressure

## 2021-08-31 LAB
A1C WITH ESTIMATED AVERAGE GLUCOSE RESULT: 5.7 % — HIGH (ref 4–5.6)
ALBUMIN SERPL ELPH-MCNC: 3.6 G/DL — SIGNIFICANT CHANGE UP (ref 3.5–5)
ALP SERPL-CCNC: 83 U/L — SIGNIFICANT CHANGE UP (ref 40–120)
ALT FLD-CCNC: 24 U/L DA — SIGNIFICANT CHANGE UP (ref 10–60)
ANION GAP SERPL CALC-SCNC: 6 MMOL/L — SIGNIFICANT CHANGE UP (ref 5–17)
AST SERPL-CCNC: 22 U/L — SIGNIFICANT CHANGE UP (ref 10–40)
BILIRUB SERPL-MCNC: 0.5 MG/DL — SIGNIFICANT CHANGE UP (ref 0.2–1.2)
BUN SERPL-MCNC: 11 MG/DL — SIGNIFICANT CHANGE UP (ref 7–18)
CALCIUM SERPL-MCNC: 9 MG/DL — SIGNIFICANT CHANGE UP (ref 8.4–10.5)
CHLORIDE SERPL-SCNC: 106 MMOL/L — SIGNIFICANT CHANGE UP (ref 96–108)
CHOLEST SERPL-MCNC: 280 MG/DL — HIGH
CO2 SERPL-SCNC: 28 MMOL/L — SIGNIFICANT CHANGE UP (ref 22–31)
COVID-19 SPIKE DOMAIN AB INTERP: POSITIVE
COVID-19 SPIKE DOMAIN ANTIBODY RESULT: >250 U/ML — HIGH
CREAT SERPL-MCNC: 0.5 MG/DL — SIGNIFICANT CHANGE UP (ref 0.5–1.3)
ESTIMATED AVERAGE GLUCOSE: 117 MG/DL — HIGH (ref 68–114)
GLUCOSE SERPL-MCNC: 95 MG/DL — SIGNIFICANT CHANGE UP (ref 70–99)
HCT VFR BLD CALC: 43.1 % — SIGNIFICANT CHANGE UP (ref 34.5–45)
HDLC SERPL-MCNC: 48 MG/DL — LOW
HGB BLD-MCNC: 14.3 G/DL — SIGNIFICANT CHANGE UP (ref 11.5–15.5)
LIPID PNL WITH DIRECT LDL SERPL: 182 MG/DL — HIGH
MAGNESIUM SERPL-MCNC: 2.4 MG/DL — SIGNIFICANT CHANGE UP (ref 1.6–2.6)
MCHC RBC-ENTMCNC: 29.9 PG — SIGNIFICANT CHANGE UP (ref 27–34)
MCHC RBC-ENTMCNC: 33.2 GM/DL — SIGNIFICANT CHANGE UP (ref 32–36)
MCV RBC AUTO: 90.2 FL — SIGNIFICANT CHANGE UP (ref 80–100)
NON HDL CHOLESTEROL: 232 MG/DL — HIGH
NRBC # BLD: 0 /100 WBCS — SIGNIFICANT CHANGE UP (ref 0–0)
PHOSPHATE SERPL-MCNC: 2.8 MG/DL — SIGNIFICANT CHANGE UP (ref 2.5–4.5)
PLATELET # BLD AUTO: 326 K/UL — SIGNIFICANT CHANGE UP (ref 150–400)
POTASSIUM SERPL-MCNC: 3.9 MMOL/L — SIGNIFICANT CHANGE UP (ref 3.5–5.3)
POTASSIUM SERPL-SCNC: 3.9 MMOL/L — SIGNIFICANT CHANGE UP (ref 3.5–5.3)
PROT SERPL-MCNC: 8.1 G/DL — SIGNIFICANT CHANGE UP (ref 6–8.3)
RBC # BLD: 4.78 M/UL — SIGNIFICANT CHANGE UP (ref 3.8–5.2)
RBC # FLD: 12.6 % — SIGNIFICANT CHANGE UP (ref 10.3–14.5)
SARS-COV-2 IGG+IGM SERPL QL IA: >250 U/ML — HIGH
SARS-COV-2 IGG+IGM SERPL QL IA: POSITIVE
SODIUM SERPL-SCNC: 140 MMOL/L — SIGNIFICANT CHANGE UP (ref 135–145)
TRIGL SERPL-MCNC: 249 MG/DL — HIGH
TSH SERPL-MCNC: 4.21 UU/ML — SIGNIFICANT CHANGE UP (ref 0.34–4.82)
WBC # BLD: 6.1 K/UL — SIGNIFICANT CHANGE UP (ref 3.8–10.5)
WBC # FLD AUTO: 6.1 K/UL — SIGNIFICANT CHANGE UP (ref 3.8–10.5)

## 2021-08-31 PROCEDURE — 99223 1ST HOSP IP/OBS HIGH 75: CPT

## 2021-08-31 PROCEDURE — 93306 TTE W/DOPPLER COMPLETE: CPT | Mod: 26

## 2021-08-31 RX ORDER — ACETAMINOPHEN 500 MG
650 TABLET ORAL ONCE
Refills: 0 | Status: COMPLETED | OUTPATIENT
Start: 2021-08-31 | End: 2021-09-01

## 2021-08-31 RX ORDER — PANTOPRAZOLE SODIUM 20 MG/1
1 TABLET, DELAYED RELEASE ORAL
Qty: 0 | Refills: 0 | DISCHARGE

## 2021-08-31 RX ORDER — EZETIMIBE 10 MG/1
1 TABLET ORAL
Qty: 0 | Refills: 0 | DISCHARGE

## 2021-08-31 RX ORDER — ATORVASTATIN CALCIUM 80 MG/1
80 TABLET, FILM COATED ORAL AT BEDTIME
Refills: 0 | Status: DISCONTINUED | OUTPATIENT
Start: 2021-08-31 | End: 2021-09-02

## 2021-08-31 RX ORDER — ASPIRIN/CALCIUM CARB/MAGNESIUM 324 MG
81 TABLET ORAL DAILY
Refills: 0 | Status: DISCONTINUED | OUTPATIENT
Start: 2021-08-30 | End: 2021-09-02

## 2021-08-31 RX ORDER — ATORVASTATIN CALCIUM 80 MG/1
1 TABLET, FILM COATED ORAL
Qty: 0 | Refills: 0 | DISCHARGE

## 2021-08-31 RX ORDER — RIVAROXABAN 15 MG-20MG
1 KIT ORAL
Qty: 0 | Refills: 0 | DISCHARGE

## 2021-08-31 RX ORDER — METOPROLOL TARTRATE 50 MG
12.5 TABLET ORAL
Refills: 0 | Status: DISCONTINUED | OUTPATIENT
Start: 2021-08-31 | End: 2021-09-02

## 2021-08-31 RX ORDER — AMLODIPINE BESYLATE 2.5 MG/1
1 TABLET ORAL
Qty: 0 | Refills: 0 | DISCHARGE

## 2021-08-31 RX ORDER — PANTOPRAZOLE SODIUM 20 MG/1
40 TABLET, DELAYED RELEASE ORAL
Refills: 0 | Status: DISCONTINUED | OUTPATIENT
Start: 2021-08-31 | End: 2021-09-02

## 2021-08-31 RX ORDER — RIVAROXABAN 15 MG-20MG
2.5 KIT ORAL
Refills: 0 | Status: DISCONTINUED | OUTPATIENT
Start: 2021-08-30 | End: 2021-09-02

## 2021-08-31 RX ORDER — LOSARTAN POTASSIUM 100 MG/1
1 TABLET, FILM COATED ORAL
Qty: 0 | Refills: 0 | DISCHARGE

## 2021-08-31 RX ADMIN — RIVAROXABAN 2.5 MILLIGRAM(S): KIT at 17:29

## 2021-08-31 RX ADMIN — LOSARTAN POTASSIUM 100 MILLIGRAM(S): 100 TABLET, FILM COATED ORAL at 06:58

## 2021-08-31 RX ADMIN — ATORVASTATIN CALCIUM 80 MILLIGRAM(S): 80 TABLET, FILM COATED ORAL at 22:22

## 2021-08-31 RX ADMIN — Medication 81 MILLIGRAM(S): at 11:28

## 2021-08-31 RX ADMIN — RIVAROXABAN 2.5 MILLIGRAM(S): KIT at 06:58

## 2021-08-31 RX ADMIN — Medication 12.5 MILLIGRAM(S): at 17:37

## 2021-08-31 NOTE — CONSULT NOTE ADULT - SUBJECTIVE AND OBJECTIVE BOX
CHIEF COMPLAINT:Patient is a 76y old  Female who presents with a chief complaint of Hypertensive Urgency .      HPI:  73 y/o F from home lives with her daughter with PMH of HTN, HLD and CAD (s/p stent x 1 in LAD 5 years ago) on xeralto. Pt was sent from her PCP Dr Wynn for Hypertensive urgency with a blood pressure of 180/88. Pt was at Dr. Wynn's office because 2 days ago she developed a pull aching pain on the right side of her neck which resolved in 3 hours but was followed by a tingling sensation in the right side of her body from head to toe. The sensation worsened yesterday but has now completely resolved. She denies any weakness or numbness during this time. She denies difficulty ambulating, dysarthria, vision changes, headache, LOC or any other complaints. She states all her symptoms have completely resolved and is back at her usual state of health.     Patient has a right facial droop, however, daughter present at bedside states it has always been like that  (30 Aug 2021 22:07)      PAST MEDICAL & SURGICAL HISTORY:  HTN (hypertension)    Coronary artery disease involving native coronary artery of native heart without angina pectoris    HLD (hyperlipidemia)    H/O abdominoplasty    Status post arterial stent        MEDICATIONS  (STANDING):  aspirin  chewable 81 milliGRAM(s) Oral daily  atorvastatin 80 milliGRAM(s) Oral at bedtime  losartan 100 milliGRAM(s) Oral daily  rivaroxaban 2.5 milliGRAM(s) Oral two times a day    MEDICATIONS  (PRN):      FAMILY HISTORY:No hx of CAD      SOCIAL HISTORY:    [x ] Non-smoker    [x ] Alcohol-denies    Allergies    No Known Allergies    Intolerances    	    REVIEW OF SYSTEMS:  CONSTITUTIONAL: No fever, weight loss, or fatigue  EYES: No eye pain, visual disturbances, or discharge  ENT:  No difficulty hearing, tinnitus, vertigo; No sinus or throat pain  NECK: No pain or stiffness  RESPIRATORY: No cough, wheezing, chills or hemoptysis; No Shortness of Breath  CARDIOVASCULAR: No chest pain, palpitations, passing out, dizziness, or leg swelling  GASTROINTESTINAL: No abdominal or epigastric pain. No nausea, vomiting, or hematemesis; No diarrhea or constipation. No melena or hematochezia.  GENITOURINARY: No dysuria, frequency, hematuria, or incontinence  NEUROLOGICAL: No headaches, memory loss, loss of strength, + numbness  SKIN: No itching, burning, rashes, or lesions   LYMPH Nodes: No enlarged glands  ENDOCRINE: No heat or cold intolerance; No hair loss  MUSCULOSKELETAL: No joint pain or swelling; No muscle, back, or extremity pain  PSYCHIATRIC: No depression, anxiety, mood swings, or difficulty sleeping  HEME/LYMPH: No easy bruising, or bleeding gums  ALLERGY AND IMMUNOLOGIC: No hives or eczema	        PHYSICAL EXAM:  T(C): 36.5 (08-31-21 @ 08:10), Max: 37.4 (08-30-21 @ 15:57)  HR: 70 (08-31-21 @ 08:10) (60 - 76)  BP: 153/64 (08-31-21 @ 08:10) (135/66 - 202/92)  RR: 16 (08-31-21 @ 08:10) (16 - 20)  SpO2: 98% (08-31-21 @ 08:10) (96% - 99%)        Appearance: Normal	  HEENT:   Normal oral mucosa, PERRL, EOMI	  Lymphatic: No lymphadenopathy  Cardiovascular: Normal S1 S2, No JVD, No murmurs, No edema  Respiratory: Lungs clear to auscultation	  Psychiatry: A & O x 3, Mood & affect appropriate  Gastrointestinal:  Soft, Non-tender, + BS	  Skin: No rashes, No ecchymoses, No cyanosis	  Neurologic: Non-focal  Extremities: Normal range of motion, No clubbing, cyanosis or edema  Vascular: Peripheral pulses palpable 2+ bilaterally    	    ECG:  	nsr,nl axis  	  	  LABS:	 	                          14.3   6.10  )-----------( 326      ( 31 Aug 2021 07:37 )             43.1     08-31    140  |  106  |  11  ----------------------------<  95  3.9   |  28  |  0.50    Ca    9.0      31 Aug 2021 07:37  Phos  2.8     08-31  Mg     2.4     08-31    TPro  8.1  /  Alb  3.6  /  TBili  0.5  /  DBili  x   /  AST  22  /  ALT  24  /  AlkPhos  83  08-31      Lipid Profile: Cholesterol 280  LDL --  HDL 48        TSH: Thyroid Stimulating Hormone, Serum: 4.21 uU/mL (08-31 @ 07:37)        EXAM:  CT BRAIN                            PROCEDURE DATE:  08/30/2021          INTERPRETATION:  INDICATIONS:  Right headache neck pain, right arm and leg numbness for 2 days    TECHNIQUE:  Serial axial images were obtained from the skull base to the vertex without intravenous contrast. Sagittal and Coronal reformats were performed    COMPARISON EXAMINATION: None.    FINDINGS:  VENTRICLES AND SULCI:  Age-appropriate involutional change  INTRA-AXIAL:  Microvascular ischemic changes involving theperiventricular and subcortical white matter age indeterminate.  EXTRA-AXIAL:  No mass or collection is seen.  VISUALIZED SINUSES:  Clear.  VISUALIZED MASTOIDS:  Clear.  CALVARIUM:  Normal.  MISCELLANEOUS:  None.    IMPRESSION:  Age-appropriate involutional change and microvascular ischemic disease age indeterminate. No evidence of large vessel occlusion. No intracranial hemorrhage.

## 2021-08-31 NOTE — ED CLERICAL - CLERICAL COMMENTS
as per Laila pt is covid neg, pt has been assigned to [id number] as per Laila pt is covid neg, pt has been assigned to [id number]

## 2021-08-31 NOTE — ED ADULT NURSE REASSESSMENT NOTE - NS ED NURSE REASSESS COMMENT FT1
Pt sleeping with cardiac monitoring in progress easily aroused no respiratory or cardiac distress noted. pt admitted to tele report given to RN nurse Deepak.

## 2021-08-31 NOTE — CONSULT NOTE ADULT - SUBJECTIVE AND OBJECTIVE BOX
***TEMPLATE ONLY***      Patient is a 76y old  Female who presents with a chief complaint of Hypertensive Urgency (31 Aug 2021 08:52)      HPI:  73 y/o F from home lives with her daughter with PMH of HTN, HLD and CAD (s/p stent x 1 in LAD 5 years ago) on xeralto. Pt was sent from her PCP Dr Wynn for Hypertensive urgency with a blood pressure of 180/88. Pt was at Dr. Wynn's office because 2 days ago she developed a pull aching pain on the right side of her neck which resolved in 3 hours but was followed by a tingling sensation in the right side of her body from head to toe. The sensation worsened yesterday but has now completely resolved. She denies any weakness or numbness during this time. She denies difficulty ambulating, dysarthria, vision changes, headache, LOC or any other complaints. She states all her symptoms have completely resolved and is back at her usual state of health.     Patient has a right facial droop, however, daughter present at bedside states it has always been like that  (30 Aug 2021 22:07)         Neurological Review of Systems:  No difficulty with language.  No vision loss or double vision.  No dizziness, vertigo or new hearing loss.  No difficulty with speech or swallowing.  No focal weakness.  No focal sensory changes.  No numbness or tingling in the bilateral lower extremities.  No difficulty with balance.  No difficulty with ambulation.        MEDICATIONS  (STANDING):  aspirin  chewable 81 milliGRAM(s) Oral daily  atorvastatin 80 milliGRAM(s) Oral at bedtime  losartan 100 milliGRAM(s) Oral daily  metoprolol tartrate 12.5 milliGRAM(s) Oral two times a day  pantoprazole    Tablet 40 milliGRAM(s) Oral before breakfast  rivaroxaban 2.5 milliGRAM(s) Oral two times a day    MEDICATIONS  (PRN):    Allergies    No Known Allergies    Intolerances      PAST MEDICAL & SURGICAL HISTORY:  HTN (hypertension)    Coronary artery disease involving native coronary artery of native heart without angina pectoris    HLD (hyperlipidemia)    H/O abdominoplasty    Status post arterial stent      FAMILY HISTORY:    SOCIAL HISTORY: non smoker/ former smoker/ active smoker    Review of Systems:  Constitutional: No generalized weakness. No fevers or chills.                    Eyes, Ears, Mouth, Throat: No vision loss   Respiratory: No shortness of breath or cough.                                Cardiovascular: No chest pain or palpitations  Gastrointestinal: No nausea or vomiting.                                         Genitourinary: No urinary incontinence or burning on urination.  Musculoskeletal: No joint pain.                                                           Dermatologic: No rash.  Neurological: as per HPI                                                                      Psychiatric: No behavioral problems.  Endocrine: No known hypoglycemia.               Hematologic/Lymphatic: No easy bleeding.    O:  Vital Signs Last 24 Hrs  T(C): 36.5 (31 Aug 2021 08:10), Max: 37.4 (30 Aug 2021 15:57)  T(F): 97.7 (31 Aug 2021 08:10), Max: 99.3 (30 Aug 2021 15:57)  HR: 70 (31 Aug 2021 08:10) (60 - 76)  BP: 153/64 (31 Aug 2021 08:10) (135/66 - 202/92)  BP(mean): --  RR: 16 (31 Aug 2021 08:10) (16 - 20)  SpO2: 98% (31 Aug 2021 08:10) (96% - 99%)    General Exam:   General appearance: No acute distress                 Cardiovascular: Pedal dorsalis pulses intact bilaterally    Mental Status: Orientated to self, date and place.  Attention intact.  No dysarthria, aphasia or neglect.  Knowledge intact.  Registration intact.  Short and long term memory grossly intact.      Cranial Nerves: CN I - not tested.  PERRL, EOMI, VFF, no nystagmus or diplopia.  No APD.  Fundi not visualized.  CN V1-3 intact to light touch and pinprick.  No facial asymmetry.  Hearing intact to finger rub bilaterally.  Tongue, uvula and palate midline.  Sternocleidomastoid and Trapezius intact bilaterally.    Motor:   Tone: normal.                  Strength intact throughout  No pronator drift bilaterally                      No dysmetria on finger-nose-finger or heel-shin-heel  No truncal ataxia.  No resting, postural or action tremor.  No myoclonus.    Sensation: intact to light touch, pinprick, vibration and proprioception    Deep Tendon Reflexes: 1+ bilateral biceps, triceps, brachioradialis, knee and ankle  Toes flexor bilaterally    Gait: normal and stable.  Rhomberg -anthony.    Other:     LABS:                        14.3   6.10  )-----------( 326      ( 31 Aug 2021 07:37 )             43.1     08-31    140  |  106  |  11  ----------------------------<  95  3.9   |  28  |  0.50    Ca    9.0      31 Aug 2021 07:37  Phos  2.8     08-31  Mg     2.4     08-31    TPro  8.1  /  Alb  3.6  /  TBili  0.5  /  DBili  x   /  AST  22  /  ALT  24  /  AlkPhos  83  08-31            RADIOLOGY & ADDITIONAL STUDIES:    < from: 12 Lead ECG (05.25.19 @ 09:39) >    Ventricular Rate 57 BPM    Atrial Rate 57 BPM    P-R Interval 176 ms    QRS Duration 72 ms    Q-T Interval 442 ms    QTC Calculation(Bezet) 430 ms    P Axis 61 degrees    R Axis -3 degrees    T Axis 76 degrees    Diagnosis Line Sinus bradycardia  Otherwise normal ECG    Confirmed by GILLES SONG ([id number]) on 5/29/2019 5:23:15 PM    < end of copied text >  < from: CT Head No Cont (08.30.21 @ 19:27) > (images reviewed)    EXAM:  CT BRAIN                            PROCEDURE DATE:  08/30/2021          INTERPRETATION:  INDICATIONS:  Right headache neck pain, right arm and leg numbness for 2 days    TECHNIQUE:  Serial axial images were obtained from the skull base to the vertex without intravenous contrast. Sagittal and Coronal reformats were performed    COMPARISON EXAMINATION: None.    FINDINGS:  VENTRICLES AND SULCI:  Age-appropriate involutional change  INTRA-AXIAL:  Microvascular ischemic changes involving theperiventricular and subcortical white matter age indeterminate.  EXTRA-AXIAL:  No mass or collection is seen.  VISUALIZED SINUSES:  Clear.  VISUALIZED MASTOIDS:  Clear.  CALVARIUM:  Normal.  MISCELLANEOUS:  None.    IMPRESSION:  Age-appropriate involutional change and microvascular ischemic disease age indeterminate. No evidence of large vessel occlusion. No intracranial hemorrhage.    --- End of Report ---            TUTU HAYWOOD MD; Attending Radiologist  This document has been electronically signed. Aug 30 2021  7:30PM    < end of copied text >   daugher translated    Patient is a 76y old  Female who presents with a chief complaint of Hypertensive Urgency (31 Aug 2021 08:52)      HPI:  71 y/o F from home lives with her daughter with PMH of HTN, HLD and CAD (s/p stent x 1 in LAD 5 years ago) on xeralto. Pt was sent from her PCP Dr Wynn for Hypertensive urgency with a blood pressure of 180/88. Pt was at Dr. Wynn's office because 2 days ago she developed a pull aching pain on the right side of her neck which resolved in 3 hours but was followed by a tingling sensation in the right side of her body from head to toe. The sensation worsened yesterday but has now completely resolved. She denies any weakness or numbness during this time. She denies difficulty ambulating, dysarthria, vision changes, headache, LOC or any other complaints. She states all her symptoms have completely resolved and is back at her usual state of health.     Patient has a right facial droop, however, daughter present at bedside states it has always been like that  (30 Aug 2021 22:07)      Neurological Review of Systems:  No difficulty with language.  No vision loss or double vision.  No dizziness, vertigo or new hearing loss.  No difficulty with speech or swallowing.   No numbness or tingling in the bilateral lower extremities.  No difficulty with balance.  No difficulty with ambulation.        MEDICATIONS  (STANDING):  aspirin  chewable 81 milliGRAM(s) Oral daily  atorvastatin 80 milliGRAM(s) Oral at bedtime  losartan 100 milliGRAM(s) Oral daily  metoprolol tartrate 12.5 milliGRAM(s) Oral two times a day  pantoprazole    Tablet 40 milliGRAM(s) Oral before breakfast  rivaroxaban 2.5 milliGRAM(s) Oral two times a day    MEDICATIONS  (PRN):    Allergies    No Known Allergies    Intolerances      PAST MEDICAL & SURGICAL HISTORY:  HTN (hypertension)    Coronary artery disease involving native coronary artery of native heart without angina pectoris    HLD (hyperlipidemia)    H/O abdominoplasty    Status post arterial stent      FAMILY HISTORY: nc daughter    SOCIAL HISTORY: non smoker    Review of Systems:  Constitutional: No fevers or chills.                    Eyes, Ears, Mouth, Throat: No vision loss   Respiratory: No shortness of breath                                 Cardiovascular: No chest pain   Gastrointestinal: No vomiting.                                         Genitourinary: No urinary incontinence  Musculoskeletal: No joint pain.                                                           Dermatologic: No rash.  Neurological: as per HPI                                                                      Psychiatric: No behavioral problems.  Endocrine: No known hypoglycemia.               Hematologic/Lymphatic: No easy bleeding.    O:  Vital Signs Last 24 Hrs  T(C): 36.5 (31 Aug 2021 08:10), Max: 37.4 (30 Aug 2021 15:57)  T(F): 97.7 (31 Aug 2021 08:10), Max: 99.3 (30 Aug 2021 15:57)  HR: 70 (31 Aug 2021 08:10) (60 - 76)  BP: 153/64 (31 Aug 2021 08:10) (135/66 - 202/92)  BP(mean): --  RR: 16 (31 Aug 2021 08:10) (16 - 20)  SpO2: 98% (31 Aug 2021 08:10) (96% - 99%)    General Exam:   General appearance: No acute distress                 Cardiovascular: Pedal dorsalis pulses intact bilaterally    Mental Status: Orientated to self, date and place.  Attention intact.  No dysarthria, aphasia or neglect.  Knowledge intact.  Registration intact.  Short and long term memory grossly intact.      Cranial Nerves: CN I - not tested.  PERRL, EOMI, VFF, no nystagmus or diplopia.  No APD.  Fundi not visualized.  CN V1-3 intact to light touch.  Right NLF (1, old)  Hearing intact to finger rub bilaterally.  Tongue, uvula and palate midline.  Sternocleidomastoid and Trapezius intact bilaterally.    Motor:   Tone: normal.                  Strength intact throughout  No pronator drift bilaterally                      No dysmetria on finger-nose-finger or heel-shin-heel  No truncal ataxia.  No resting, postural or action tremor.  No myoclonus.    Sensation: intact to light touch    Deep Tendon Reflexes: 1+ bilateral biceps, triceps, brachioradialis, knee and ankle  Toes flexor bilaterally    Gait: normal and stable.      Other: NIHSS 1, MRS 0    LABS:                        14.3   6.10  )-----------( 326      ( 31 Aug 2021 07:37 )             43.1     08-31    140  |  106  |  11  ----------------------------<  95  3.9   |  28  |  0.50    Ca    9.0      31 Aug 2021 07:37  Phos  2.8     08-31  Mg     2.4     08-31    TPro  8.1  /  Alb  3.6  /  TBili  0.5  /  DBili  x   /  AST  22  /  ALT  24  /  AlkPhos  83  08-31            RADIOLOGY & ADDITIONAL STUDIES:    < from: 12 Lead ECG (05.25.19 @ 09:39) >    Ventricular Rate 57 BPM    Atrial Rate 57 BPM    P-R Interval 176 ms    QRS Duration 72 ms    Q-T Interval 442 ms    QTC Calculation(Bezet) 430 ms    P Axis 61 degrees    R Axis -3 degrees    T Axis 76 degrees    Diagnosis Line Sinus bradycardia  Otherwise normal ECG    Confirmed by GILLES SONG ([id number]) on 5/29/2019 5:23:15 PM    < end of copied text >  < from: CT Head No Cont (08.30.21 @ 19:27) > (images reviewed)    EXAM:  CT BRAIN                            PROCEDURE DATE:  08/30/2021          INTERPRETATION:  INDICATIONS:  Right headache neck pain, right arm and leg numbness for 2 days    TECHNIQUE:  Serial axial images were obtained from the skull base to the vertex without intravenous contrast. Sagittal and Coronal reformats were performed    COMPARISON EXAMINATION: None.    FINDINGS:  VENTRICLES AND SULCI:  Age-appropriate involutional change  INTRA-AXIAL:  Microvascular ischemic changes involving theperiventricular and subcortical white matter age indeterminate.  EXTRA-AXIAL:  No mass or collection is seen.  VISUALIZED SINUSES:  Clear.  VISUALIZED MASTOIDS:  Clear.  CALVARIUM:  Normal.  MISCELLANEOUS:  None.    IMPRESSION:  Age-appropriate involutional change and microvascular ischemic disease age indeterminate. No evidence of large vessel occlusion. No intracranial hemorrhage.    --- End of Report ---            TUTU HAYWOOD MD; Attending Radiologist  This document has been electronically signed. Aug 30 2021  7:30PM    < end of copied text >

## 2021-08-31 NOTE — PROGRESS NOTE ADULT - SUBJECTIVE AND OBJECTIVE BOX
PGY-1 Progress Note discussed with attending    PAGER #: [[phone]] TILL 5:00 PM  PLEASE CONTACT ON CALL TEAM:  - On Call Team (Please refer to Kellen) FROM 5:00 PM - 8:30PM  - Nightfloat Team FROM 8:30 -7:30 AM    CHIEF COMPLAINT & BRIEF HOSPITAL COURSE:    INTERVAL HPI/OVERNIGHT EVENTS:       REVIEW OF SYSTEMS:  CONSTITUTIONAL: No fever, weight loss, or fatigue  RESPIRATORY: No cough, wheezing, chills or hemoptysis; No shortness of breath  CARDIOVASCULAR: No chest pain, palpitations, dizziness, or leg swelling  GASTROINTESTINAL: No abdominal pain. No nausea, vomiting, or hematemesis; No diarrhea or constipation. No melena or hematochezia.  GENITOURINARY: No dysuria or hematuria, urinary frequency  NEUROLOGICAL: No headaches, memory loss, loss of strength, numbness, or tremors  SKIN: No itching, burning, rashes, or lesions     Vital Signs Last 24 Hrs  T(C): 36.4 (31 Aug 2021 11:19), Max: 37.4 (30 Aug 2021 15:57)  T(F): 97.5 (31 Aug 2021 11:19), Max: 99.3 (30 Aug 2021 15:57)  HR: 59 (31 Aug 2021 11:19) (59 - 76)  BP: 149/67 (31 Aug 2021 11:19) (135/66 - 202/92)  BP(mean): --  RR: 17 (31 Aug 2021 11:19) (16 - 20)  SpO2: 98% (31 Aug 2021 11:19) (96% - 99%)    PHYSICAL EXAMINATION:  GENERAL: NAD, well built  HEAD:  Atraumatic, Normocephalic  EYES:  conjunctiva and sclera clear  NECK: Supple, No JVD, Normal thyroid  CHEST/LUNG: Clear to auscultation. Clear to percussion bilaterally; No rales, rhonchi, wheezing, or rubs  HEART: Regular rate and rhythm; No murmurs, rubs, or gallops  ABDOMEN: Soft, Nontender, Nondistended; Bowel sounds present  NERVOUS SYSTEM:  Alert & Oriented X3,    EXTREMITIES:  2+ Peripheral Pulses, No clubbing, cyanosis, or edema  SKIN: warm dry                          14.3   6.10  )-----------( 326      ( 31 Aug 2021 07:37 )             43.1     08-31    140  |  106  |  11  ----------------------------<  95  3.9   |  28  |  0.50    Ca    9.0      31 Aug 2021 07:37  Phos  2.8     08-31  Mg     2.4     08-31    TPro  8.1  /  Alb  3.6  /  TBili  0.5  /  DBili  x   /  AST  22  /  ALT  24  /  AlkPhos  83  08-31    LIVER FUNCTIONS - ( 31 Aug 2021 07:37 )  Alb: 3.6 g/dL / Pro: 8.1 g/dL / ALK PHOS: 83 U/L / ALT: 24 U/L DA / AST: 22 U/L / GGT: x                   CAPILLARY BLOOD GLUCOSE      RADIOLOGY & ADDITIONAL TESTS:

## 2021-08-31 NOTE — PROGRESS NOTE ADULT - PROBLEM SELECTOR PLAN 2
Pt presented with 2 days of paresthesias   CT head: Age-appropriate involutional change and microvascular ischemic disease age indeterminate. No evidence of large vessel occlusion. No intracranial hemorrhage.  - MRI f/u.   Symptoms resolved now  Rt UE strength 4/5   S/p aspirin in ED  Will start on high dose atorvastatin, aspirin and continue home medication Xarelto  Neurology consulted - Dr. Chance.

## 2021-08-31 NOTE — CONSULT NOTE ADULT - ASSESSMENT
73 y/o F from home lives with her daughter with PMH of HTN, HLD and CAD (s/p stent x 1 in LAD 5 years ago) on xeralto,Rt sided numbness.  1.Tele monitoring.  2.Echocardiogram.  3.CAD-asa,xarelto,b blocker,statin.  4.HTN-cozaar,add b blocker.  5.Lipid d/o-statin,add vascepa as outpatient.  6.Neurology eval.  7.PPI.
Right face, arm and leg tingling in setting of HTN concerning for hypertensive urgency, symptoms resolved with the control of the BP   old right facial weakness    Recommend to get MRI brain to r/o cva  BP control as per primary team    dw resident and Dr. Butterfield

## 2021-08-31 NOTE — PROGRESS NOTE ADULT - SUBJECTIVE AND OBJECTIVE BOX
Patient is a 76y old  Female who presents with a chief complaint of Hypertensive Urgency (30 Aug 2021 22:07)    pt seen in icu [  ], reg med floor [   ], bed [  ], chair at bedside [   ], a+o x3 [  ], lethargic [  ],  nad [  ]    walker [  ], ngt [  ], peg [  ], et tube [  ], cent line [  ], picc line [  ]        Allergies    No Known Allergies        Vitals    T(F): 97 (08-31-21 @ 05:35), Max: 99.3 (08-30-21 @ 15:57)  HR: 65 (08-31-21 @ 05:35) (60 - 76)  BP: 161/63 (08-31-21 @ 05:35) (135/66 - 202/92)  RR: 17 (08-31-21 @ 05:35) (16 - 20)  SpO2: 98% (08-31-21 @ 05:35) (96% - 99%)  Wt(kg): --  CAPILLARY BLOOD GLUCOSE      POCT Blood Glucose.: 107 mg/dL (30 Aug 2021 16:09)      Labs                          14.3   6.10  )-----------( 326      ( 31 Aug 2021 07:37 )             43.1       08-30    139  |  107  |  14  ----------------------------<  97  4.1   |  26  |  0.59    Ca    8.7      30 Aug 2021 18:51    TPro  8.2  /  Alb  3.6  /  TBili  0.3  /  DBili  x   /  AST  30  /  ALT  27  /  AlkPhos  89  08-30                Radiology Results      Meds    MEDICATIONS  (STANDING):  aspirin  chewable 81 milliGRAM(s) Oral daily  atorvastatin 80 milliGRAM(s) Oral at bedtime  losartan 100 milliGRAM(s) Oral daily  rivaroxaban 2.5 milliGRAM(s) Oral two times a day      MEDICATIONS  (PRN):      Physical Exam    Neuro :  no focal deficits  Respiratory: CTA B/L  CV: RRR, S1S2, no murmurs,   Abdominal: Soft, NT, ND +BS,  Extremities: No edema, + peripheral pulses    ASSESSMENT    Paresthesia    HTN (hypertension)    Coronary artery disease involving native coronary artery of native heart without angina pectoris    HLD (hyperlipidemia)    H/O abdominoplasty    Status post arterial stent        PLAN     Patient is a 76y old  Female who presents with a chief complaint of Hypertensive Urgency (30 Aug 2021 22:07)    pt seen in tele [ x ], reg med floor [   ], bed [ x ], chair at bedside [   ], a+o x3 [ x ], lethargic [  ],  nad [ x ]      Allergies    No Known Allergies        Vitals    T(F): 97 (08-31-21 @ 05:35), Max: 99.3 (08-30-21 @ 15:57)  HR: 65 (08-31-21 @ 05:35) (60 - 76)  BP: 161/63 (08-31-21 @ 05:35) (135/66 - 202/92)  RR: 17 (08-31-21 @ 05:35) (16 - 20)  SpO2: 98% (08-31-21 @ 05:35) (96% - 99%)  Wt(kg): --  CAPILLARY BLOOD GLUCOSE      POCT Blood Glucose.: 107 mg/dL (30 Aug 2021 16:09)      Labs                          14.3   6.10  )-----------( 326      ( 31 Aug 2021 07:37 )             43.1       08-30    139  |  107  |  14  ----------------------------<  97  4.1   |  26  |  0.59    Ca    8.7      30 Aug 2021 18:51    TPro  8.2  /  Alb  3.6  /  TBili  0.3  /  DBili  x   /  AST  30  /  ALT  27  /  AlkPhos  89  08-30        Radiology Results      Meds    MEDICATIONS  (STANDING):  aspirin  chewable 81 milliGRAM(s) Oral daily  atorvastatin 80 milliGRAM(s) Oral at bedtime  losartan 100 milliGRAM(s) Oral daily  rivaroxaban 2.5 milliGRAM(s) Oral two times a day      MEDICATIONS  (PRN):      Physical Exam    Neuro :  no focal deficits  Respiratory: CTA B/L  CV: RRR, S1S2, no murmurs,   Abdominal: Soft, NT, ND +BS,  Extremities: No edema, + peripheral pulses        ASSESSMENT    right tingling r/o cva,   uncontrolled htn with urgency,   r/o acs,   h/o HTN,   HLD   CAD (s/p stent x 1 in LAD 5 years ago) on xeralto  abdominoplasty      PLAN    cont tele,   aspirin, statin,   neuro cons  f/u carotid duplex   ce q8 x3   cardio cons   f/u echo   cont current meds

## 2021-09-01 LAB
ALBUMIN SERPL ELPH-MCNC: 3.2 G/DL — LOW (ref 3.5–5)
ALP SERPL-CCNC: 79 U/L — SIGNIFICANT CHANGE UP (ref 40–120)
ALT FLD-CCNC: 23 U/L DA — SIGNIFICANT CHANGE UP (ref 10–60)
ANION GAP SERPL CALC-SCNC: 7 MMOL/L — SIGNIFICANT CHANGE UP (ref 5–17)
AST SERPL-CCNC: 21 U/L — SIGNIFICANT CHANGE UP (ref 10–40)
BASOPHILS # BLD AUTO: 0.09 K/UL — SIGNIFICANT CHANGE UP (ref 0–0.2)
BASOPHILS NFR BLD AUTO: 1.3 % — SIGNIFICANT CHANGE UP (ref 0–2)
BILIRUB SERPL-MCNC: 0.5 MG/DL — SIGNIFICANT CHANGE UP (ref 0.2–1.2)
BUN SERPL-MCNC: 17 MG/DL — SIGNIFICANT CHANGE UP (ref 7–18)
CALCIUM SERPL-MCNC: 9.1 MG/DL — SIGNIFICANT CHANGE UP (ref 8.4–10.5)
CHLORIDE SERPL-SCNC: 105 MMOL/L — SIGNIFICANT CHANGE UP (ref 96–108)
CO2 SERPL-SCNC: 26 MMOL/L — SIGNIFICANT CHANGE UP (ref 22–31)
CREAT SERPL-MCNC: 0.63 MG/DL — SIGNIFICANT CHANGE UP (ref 0.5–1.3)
EOSINOPHIL # BLD AUTO: 0.24 K/UL — SIGNIFICANT CHANGE UP (ref 0–0.5)
EOSINOPHIL NFR BLD AUTO: 3.6 % — SIGNIFICANT CHANGE UP (ref 0–6)
GLUCOSE SERPL-MCNC: 91 MG/DL — SIGNIFICANT CHANGE UP (ref 70–99)
HCT VFR BLD CALC: 42.4 % — SIGNIFICANT CHANGE UP (ref 34.5–45)
HGB BLD-MCNC: 13.9 G/DL — SIGNIFICANT CHANGE UP (ref 11.5–15.5)
IMM GRANULOCYTES NFR BLD AUTO: 0.6 % — SIGNIFICANT CHANGE UP (ref 0–1.5)
LYMPHOCYTES # BLD AUTO: 3.28 K/UL — SIGNIFICANT CHANGE UP (ref 1–3.3)
LYMPHOCYTES # BLD AUTO: 49.2 % — HIGH (ref 13–44)
MAGNESIUM SERPL-MCNC: 2.5 MG/DL — SIGNIFICANT CHANGE UP (ref 1.6–2.6)
MCHC RBC-ENTMCNC: 29.8 PG — SIGNIFICANT CHANGE UP (ref 27–34)
MCHC RBC-ENTMCNC: 32.8 GM/DL — SIGNIFICANT CHANGE UP (ref 32–36)
MCV RBC AUTO: 91 FL — SIGNIFICANT CHANGE UP (ref 80–100)
MONOCYTES # BLD AUTO: 0.48 K/UL — SIGNIFICANT CHANGE UP (ref 0–0.9)
MONOCYTES NFR BLD AUTO: 7.2 % — SIGNIFICANT CHANGE UP (ref 2–14)
NEUTROPHILS # BLD AUTO: 2.54 K/UL — SIGNIFICANT CHANGE UP (ref 1.8–7.4)
NEUTROPHILS NFR BLD AUTO: 38.1 % — LOW (ref 43–77)
NRBC # BLD: 0 /100 WBCS — SIGNIFICANT CHANGE UP (ref 0–0)
PHOSPHATE SERPL-MCNC: 4.1 MG/DL — SIGNIFICANT CHANGE UP (ref 2.5–4.5)
PLATELET # BLD AUTO: 341 K/UL — SIGNIFICANT CHANGE UP (ref 150–400)
POTASSIUM SERPL-MCNC: 4.2 MMOL/L — SIGNIFICANT CHANGE UP (ref 3.5–5.3)
POTASSIUM SERPL-SCNC: 4.2 MMOL/L — SIGNIFICANT CHANGE UP (ref 3.5–5.3)
PROT SERPL-MCNC: 7.6 G/DL — SIGNIFICANT CHANGE UP (ref 6–8.3)
RBC # BLD: 4.66 M/UL — SIGNIFICANT CHANGE UP (ref 3.8–5.2)
RBC # FLD: 12.7 % — SIGNIFICANT CHANGE UP (ref 10.3–14.5)
SODIUM SERPL-SCNC: 138 MMOL/L — SIGNIFICANT CHANGE UP (ref 135–145)
WBC # BLD: 6.67 K/UL — SIGNIFICANT CHANGE UP (ref 3.8–10.5)
WBC # FLD AUTO: 6.67 K/UL — SIGNIFICANT CHANGE UP (ref 3.8–10.5)

## 2021-09-01 PROCEDURE — 93880 EXTRACRANIAL BILAT STUDY: CPT | Mod: 26

## 2021-09-01 PROCEDURE — 70551 MRI BRAIN STEM W/O DYE: CPT | Mod: 26

## 2021-09-01 RX ADMIN — PANTOPRAZOLE SODIUM 40 MILLIGRAM(S): 20 TABLET, DELAYED RELEASE ORAL at 05:41

## 2021-09-01 RX ADMIN — Medication 650 MILLIGRAM(S): at 01:00

## 2021-09-01 RX ADMIN — LOSARTAN POTASSIUM 100 MILLIGRAM(S): 100 TABLET, FILM COATED ORAL at 05:40

## 2021-09-01 RX ADMIN — Medication 12.5 MILLIGRAM(S): at 05:40

## 2021-09-01 RX ADMIN — RIVAROXABAN 2.5 MILLIGRAM(S): KIT at 05:40

## 2021-09-01 RX ADMIN — Medication 81 MILLIGRAM(S): at 11:19

## 2021-09-01 RX ADMIN — ATORVASTATIN CALCIUM 80 MILLIGRAM(S): 80 TABLET, FILM COATED ORAL at 22:53

## 2021-09-01 RX ADMIN — Medication 12.5 MILLIGRAM(S): at 17:09

## 2021-09-01 RX ADMIN — Medication 650 MILLIGRAM(S): at 00:05

## 2021-09-01 RX ADMIN — RIVAROXABAN 2.5 MILLIGRAM(S): KIT at 17:09

## 2021-09-01 NOTE — PROGRESS NOTE ADULT - PROBLEM SELECTOR PLAN 5
IMPROVE VTE Individual Risk Assessment  RISK                                                                Points  [  ] Previous VTE                                                  3  [  ] Thrombophilia                                               2  [  ] Lower limb paralysis                                      2        (unable to hold up >15 seconds)    [  ] Current Cancer                                              2         (within 6 months)  [x  ] Immobilization > 24 hrs                                1  [  ] ICU/CCU stay > 24 hours                              1  [x  ] Age > 60                                                      1  IMPROVE VTE Score _________2, -- for DVT proph  on Xarelto
IMPROVE VTE Individual Risk Assessment  RISK                                                                Points  [  ] Previous VTE                                                  3  [  ] Thrombophilia                                               2  [  ] Lower limb paralysis                                      2        (unable to hold up >15 seconds)    [  ] Current Cancer                                              2         (within 6 months)  [x  ] Immobilization > 24 hrs                                1  [  ] ICU/CCU stay > 24 hours                              1  [x  ] Age > 60                                                      1  IMPROVE VTE Score _________2, -- for DVT proph  on Xarelto

## 2021-09-01 NOTE — PROGRESS NOTE ADULT - SUBJECTIVE AND OBJECTIVE BOX
PGY-1 Progress Note discussed with attending    PAGER #: [[phone]] TILL 5:00 PM  PLEASE CONTACT ON CALL TEAM:  - On Call Team (Please refer to Kellen) FROM 5:00 PM - 8:30PM  - Nightfloat Team FROM 8:30 -7:30 AM    CHIEF COMPLAINT & BRIEF HOSPITAL COURSE: HPI:  71 y/o F from home lives with her daughter with PMH of HTN, HLD and CAD (s/p stent x 1 in LAD 5 years ago) on xeralto. Pt was sent from her PCP Dr Wynn for Hypertensive urgency with a blood pressure of 180/88. Pt was at Dr. Wynn's office because 2 days ago she developed a pull aching pain on the right side of her neck which resolved in 3 hours but was followed by a tingling sensation in the right side of her body from head to toe. The sensation worsened yesterday but has now completely resolved. She denies any weakness or numbness during this time. She denies difficulty ambulating, dysarthria, vision changes, headache, LOC or any other complaints. She states all her symptoms have completely resolved and is back at her usual state of health.     Patient has a right facial droop, however, daughter present at bedside states it has always been like that  (30 Aug 2021 22:07)      INTERVAL HPI/OVERNIGHT EVENTS: Patient was examined while she was lying in bed, Aox3, responding appropriately to questions, in NAD. Pt denied any chest pain, shortness of breath, nausea, vomiting or abdominal pain.       REVIEW OF SYSTEMS:  CONSTITUTIONAL: No fever, weight loss, or fatigue  RESPIRATORY: No cough, wheezing, chills or hemoptysis; No shortness of breath  CARDIOVASCULAR: No chest pain, palpitations, dizziness, or leg swelling  GASTROINTESTINAL: No abdominal pain. No nausea, vomiting, or hematemesis; No diarrhea or constipation. No melena or hematochezia.  GENITOURINARY: No dysuria or hematuria, urinary frequency  NEUROLOGICAL: No headaches, memory loss, loss of strength, numbness, or tremors  SKIN: No itching, burning, rashes, or lesions     MEDICATIONS  (STANDING):  aspirin  chewable 81 milliGRAM(s) Oral daily  atorvastatin 80 milliGRAM(s) Oral at bedtime  losartan 100 milliGRAM(s) Oral daily  metoprolol tartrate 12.5 milliGRAM(s) Oral two times a day  pantoprazole    Tablet 40 milliGRAM(s) Oral before breakfast  rivaroxaban 2.5 milliGRAM(s) Oral two times a day    MEDICATIONS  (PRN):      Vital Signs Last 24 Hrs  T(C): 36.6 (01 Sep 2021 08:13), Max: 36.8 (31 Aug 2021 16:03)  T(F): 97.8 (01 Sep 2021 08:13), Max: 98.3 (31 Aug 2021 16:03)  HR: 67 (01 Sep 2021 08:13) (52 - 68)  BP: 125/77 (01 Sep 2021 08:13) (125/77 - 148/68)  BP(mean): --  RR: 18 (01 Sep 2021 08:13) (17 - 18)  SpO2: 100% (01 Sep 2021 08:13) (97% - 100%)    PHYSICAL EXAMINATION:  GENERAL: NAD, well built  HEAD:  Atraumatic, Normocephalic  EYES:  conjunctiva and sclera clear  NECK: Supple, No JVD,   CHEST/LUNG: Clear to auscultation. No rales, rhonchi, wheezing, or rubs  HEART: Regular rate and rhythm; No murmurs, rubs, or gallops  ABDOMEN: Soft, Nontender, Nondistended; Bowel sounds present  NERVOUS SYSTEM:  Alert & Oriented X3,    EXTREMITIES:  2+ Peripheral Pulses, No clubbing, cyanosis, or edema  SKIN: warm dry                          13.9   6.67  )-----------( 341      ( 01 Sep 2021 06:59 )             42.4     09-01    138  |  105  |  17  ----------------------------<  91  4.2   |  26  |  0.63    Ca    9.1      01 Sep 2021 06:59  Phos  4.1     09-01  Mg     2.5     09-01    < from: MR Head No Cont (09.01.21 @ 10:43) >    EXAM:  MR BRAIN                            PROCEDURE DATE:  09/01/2021          INTERPRETATION:  Exam Date: 9/1/2021 10:43 AM    MR brain  without gadolinium    CLINICAL INFORMATION: Altered mental status r/o stroke    TECHNIQUE:   Sagittal and axial T1-weighted images, axial FLAIR images, gradient echo, axial  T2-weighted images and axial diffusion weighted images of the brain were obtained.    FINDINGS: Comparison to CT head of 8/30/2021.    There is no evidence of acute infarct, hemorrhage, or mass lesion. There are scattered foci of FLAIR hyperintensity in the periventricular and subcortical white matter of the bilateral cerebri, compatible with moderate chronic microvascular ischemic changes. There is no midline shift or herniation pattern. No mass effect is found in the brain.    The ventricles, sulci and basal cisterns appear unremarkable.    The vertebral and internal carotid arteries demonstrate expected flow voids indicating their patency.    The paranasal sinuses are clear.    IMPRESSION:   No acute infarct. Moderate periventricular and subcortical white matter chronic microvascular ischemic changes.    --- End of Report ---            NUZHAT NEVAREZ MD; Attending Radiologist  This document has been electronically signed. Sep  1 2021 10:48AM    < end of copied text >  TPro  7.6  /  Alb  3.2<L>  /  TBili  0.5  /  DBili  x   /  AST  21  /  ALT  23  /  AlkPhos  79  09-01    LIVER FUNCTIONS - ( 01 Sep 2021 06:59 )  Alb: 3.2 g/dL / Pro: 7.6 g/dL / ALK PHOS: 79 U/L / ALT: 23 U/L DA / AST: 21 U/L / GGT: x                        PGY-1 Progress Note discussed with attending    PAGER #: [[phone]] TILL 5:00 PM  PLEASE CONTACT ON CALL TEAM:  - On Call Team (Please refer to Kellen) FROM 5:00 PM - 8:30PM  - Nightfloat Team FROM 8:30 -7:30 AM    CHIEF COMPLAINT & BRIEF HOSPITAL COURSE: HPI:  71 y/o F from home lives with her daughter with PMH of HTN, HLD and CAD (s/p stent x 1 in LAD 5 years ago) on xeralto. Pt was sent from her PCP Dr Wynn for Hypertensive urgency with a blood pressure of 180/88. Pt was at Dr. Wynn's office because 2 days ago she developed a pull aching pain on the right side of her neck which resolved in 3 hours but was followed by a tingling sensation in the right side of her body from head to toe. The sensation worsened yesterday but has now completely resolved. She denies any weakness or numbness during this time. She denies difficulty ambulating, dysarthria, vision changes, headache, LOC or any other complaints. She states all her symptoms have completely resolved and is back at her usual state of health.     Patient has a right facial droop, however, daughter present at bedside states it has always been like that  (30 Aug 2021 22:07)      INTERVAL HPI/OVERNIGHT EVENTS: Patient was examined while she was lying in bed, Aox3, responding appropriately to questions, in NAD. Pt denied any chest pain, shortness of breath, nausea, vomiting or abdominal pain.       REVIEW OF SYSTEMS:  CONSTITUTIONAL: No fever, weight loss, or fatigue  RESPIRATORY: No cough, wheezing, chills or hemoptysis; No shortness of breath  CARDIOVASCULAR: No chest pain, palpitations, dizziness, or leg swelling  GASTROINTESTINAL: No abdominal pain. No nausea, vomiting, or hematemesis; No diarrhea or constipation. No melena or hematochezia.  GENITOURINARY: No dysuria or hematuria, urinary frequency  NEUROLOGICAL: No headaches, memory loss, loss of strength, numbness, or tremors  SKIN: No itching, burning, rashes, or lesions     MEDICATIONS  (STANDING):  aspirin  chewable 81 milliGRAM(s) Oral daily  atorvastatin 80 milliGRAM(s) Oral at bedtime  losartan 100 milliGRAM(s) Oral daily  metoprolol tartrate 12.5 milliGRAM(s) Oral two times a day  pantoprazole    Tablet 40 milliGRAM(s) Oral before breakfast  rivaroxaban 2.5 milliGRAM(s) Oral two times a day    MEDICATIONS  (PRN):      Vital Signs Last 24 Hrs  T(C): 36.6 (01 Sep 2021 08:13), Max: 36.8 (31 Aug 2021 16:03)  T(F): 97.8 (01 Sep 2021 08:13), Max: 98.3 (31 Aug 2021 16:03)  HR: 67 (01 Sep 2021 08:13) (52 - 68)  BP: 125/77 (01 Sep 2021 08:13) (125/77 - 148/68)  BP(mean): --  RR: 18 (01 Sep 2021 08:13) (17 - 18)  SpO2: 100% (01 Sep 2021 08:13) (97% - 100%)    PHYSICAL EXAMINATION:  GENERAL: NAD, well built  HEAD:  Atraumatic, Normocephalic  EYES:  conjunctiva and sclera clear  NECK: Supple, No JVD,   CHEST/LUNG: Clear to auscultation. No rales, rhonchi, wheezing, or rubs  HEART: Regular rate and rhythm; No murmurs, rubs, or gallops  ABDOMEN: Soft, Nontender, Nondistended; Bowel sounds present  NERVOUS SYSTEM:  Alert & Oriented X3,    EXTREMITIES:  2+ Peripheral Pulses, No clubbing, cyanosis, or edema  SKIN: warm dry                          13.9   6.67  )-----------( 341      ( 01 Sep 2021 06:59 )             42.4     09-01    138  |  105  |  17  ----------------------------<  91  4.2   |  26  |  0.63    Ca    9.1      01 Sep 2021 06:59  Phos  4.1     09-01  Mg     2.5     09-01    < from: MR Head No Cont (09.01.21 @ 10:43) >    EXAM:  MR BRAIN                            PROCEDURE DATE:  09/01/2021          INTERPRETATION:  Exam Date: 9/1/2021 10:43 AM    MR brain  without gadolinium    CLINICAL INFORMATION: Altered mental status r/o stroke    TECHNIQUE:   Sagittal and axial T1-weighted images, axial FLAIR images, gradient echo, axial  T2-weighted images and axial diffusion weighted images of the brain were obtained.    FINDINGS: Comparison to CT head of 8/30/2021.    There is no evidence of acute infarct, hemorrhage, or mass lesion. There are scattered foci of FLAIR hyperintensity in the periventricular and subcortical white matter of the bilateral cerebri, compatible with moderate chronic microvascular ischemic changes. There is no midline shift or herniation pattern. No mass effect is found in the brain.    The ventricles, sulci and basal cisterns appear unremarkable.    The vertebral and internal carotid arteries demonstrate expected flow voids indicating their patency.    The paranasal sinuses are clear.    IMPRESSION:   No acute infarct. Moderate periventricular and subcortical white matter chronic microvascular ischemic changes.    --- End of Report ---            NUZHAT NEVAREZ MD; Attending Radiologist  This document has been electronically signed. Sep  1 2021 10:48AM    < end of copied text >  TPro  7.6  /  Alb  3.2<L>  /  TBili  0.5  /  DBili  x   /  AST  21  /  ALT  23  /  AlkPhos  79  09-01    LIVER FUNCTIONS - ( 01 Sep 2021 06:59 )  Alb: 3.2 g/dL / Pro: 7.6 g/dL / ALK PHOS: 79 U/L / ALT: 23 U/L DA / AST: 21 U/L / GGT: x           < from: MR Head No Cont (09.01.21 @ 10:43) >    EXAM:  MR BRAIN                            PROCEDURE DATE:  09/01/2021          INTERPRETATION:  Exam Date: 9/1/2021 10:43 AM    MR brain  without gadolinium    CLINICAL INFORMATION: Altered mental status r/o stroke    TECHNIQUE:   Sagittal and axial T1-weighted images, axial FLAIR images, gradient echo, axial  T2-weighted images and axial diffusion weighted images of the brain were obtained.    FINDINGS: Comparison to CT head of 8/30/2021.    There is no evidence of acute infarct, hemorrhage, or mass lesion. There are scattered foci of FLAIR hyperintensity in the periventricular and subcortical white matter of the bilateral cerebri, compatible with moderate chronic microvascular ischemic changes. There is no midline shift or herniation pattern. No mass effect is found in the brain.    The ventricles, sulci and basal cisterns appear unremarkable.    The vertebral and internal carotid arteries demonstrate expected flow voids indicating their patency.    The paranasal sinuses are clear.    IMPRESSION:   No acute infarct. Moderate periventricular and subcortical white matter chronic microvascular ischemic changes.    --- End of Report ---            NUZHAT NEVAREZ MD; Attending Radiologist  This document has been electronically signed. Sep  1 2021 10:48AM    < end of copied text >    < from: US Duplex Carotid Arteries Complete, Bilateral (09.01.21 @ 11:19) >    EXAM:  US DPLX CAROTIDS COMPL BI                            PROCEDURE DATE:  09/01/2021          INTERPRETATION:  CLINICAL INFORMATION: Skin paresthesia.    TECHNIQUE: Carotid duplex examination using grayscale B mode, color flow, and spectral Doppler.  COMPARISON: None.    Findings:    RIGHT --    Common carotid artery: Mild plaque. Peak systolic velocity 73 cm/sec.    Internal carotid artery: Mild plaque. Peak systolic velocity 54 cm/sec. End-diastolic velocity 16 cm/sec.    ICA/CCA ratio: 0.7    External carotid artery: Peak systolic velocity 65 cm/sec.    Vertebral artery: Antegrade flow.    ====================================================================    LEFT --    Common carotid artery: Mild plaque. Peak systolic velocity 78 cm/sec.    Internal carotid artery: Mild plaque. Peak systolic velocity 74 cm/sec. End-diastolic velocity 21 cm/sec.    ICA/CCA ratio: 1.1    External carotid artery: Peak systolic velocity 58 cm/sec.    Vertebral artery: Antegrade flow.        IMPRESSION:    1.  Right carotid artery: No evidence of hemodynamically significant stenosis.  2.  Left carotid artery: No evidence of hemodynamically significant stenosis.  3.  Vertebral arteries: Antegrade flow.    ----  BASED ON RADIOLOGY CONSENSUS PANEL GRAYSCALE AND DOPPLER ULTRASOUND CRITERIA FOR DIAGNOSIS OF ICA STENOSIS GUIDELINES [facility] RADIOLOGY [id number].  ---    CAROTID STENOSIS REFERENCE USING [facility] CRITERIA:  Mild - <50% stenosis. ICA PSV is less than 125 cm/second and plaque or intimal thickening is visible.  Moderate - 50-69% stenosis. ICA PSV is 125 to 230 cm/second and plaque is visible.  Severe - 70-94% stenosis. ICA PSV is more than 230 cm/second and visible plaque with lumen narrowing is seen.  Near occlusion - 95-99% stenosis. ICA PSV is variable and significant plaque with luminal narrowing is seen.  Occluded - 100% stenosis. No flow identified.    --- End of Report ---            MEGHNA SAAVEDRA MD; Attending Radiologist  This document has been electronically signed. Sep  1 2021  1:16PM    < end of copied text >

## 2021-09-01 NOTE — PROGRESS NOTE ADULT - PROBLEM SELECTOR PLAN 3
Takes Ezetimibe and atorvastatin as per previous records  Will start on atorvastatin   Confirm home meds
Takes Ezetimibe and atorvastatin as per previous records  Will start on atorvastatin   Confirm home meds

## 2021-09-01 NOTE — PROGRESS NOTE ADULT - ASSESSMENT
73 y/o F from home lives with her daughter with PMH of HTN, HLD and CAD (s/p stent x 1 in LAD 5 years ago) on xeralto. Pt was sent from her PCP Dr Wynn for Hypertensive urgency with a blood pressure of 180/88.

## 2021-09-01 NOTE — PROGRESS NOTE ADULT - SUBJECTIVE AND OBJECTIVE BOX
Patient is a 76y old  Female who presents with a chief complaint of Hypertensive Urgency (01 Sep 2021 09:54)    pt seen in icu [  ], reg med floor [ x  ], bed [ x ], chair at bedside [   ], a+o x3 [ x ], lethargic [  ],  nad [ x ]    walker [  ], ngt [  ], peg [  ], et tube [  ], cent line [  ], picc line [  ]        Allergies    No Known Allergies        Vitals    T(F): 97.8 (09-01-21 @ 08:13), Max: 98.3 (08-31-21 @ 16:03)  HR: 67 (09-01-21 @ 08:13) (52 - 68)  BP: 125/77 (09-01-21 @ 08:13) (125/77 - 148/68)  RR: 18 (09-01-21 @ 08:13) (17 - 18)  SpO2: 100% (09-01-21 @ 08:13) (97% - 100%)  Wt(kg): --  CAPILLARY BLOOD GLUCOSE      POCT Blood Glucose.: 107 mg/dL (30 Aug 2021 16:09)      Labs                          13.9   6.67  )-----------( 341      ( 01 Sep 2021 06:59 )             42.4       09-01    138  |  105  |  17  ----------------------------<  91  4.2   |  26  |  0.63    Ca    9.1      01 Sep 2021 06:59  Phos  4.1     09-01  Mg     2.5     09-01    TPro  7.6  /  Alb  3.2<L>  /  TBili  0.5  /  DBili  x   /  AST  21  /  ALT  23  /  AlkPhos  79  09-01                Radiology Results    < from: MR Head No Cont (09.01.21 @ 10:43) >  IMPRESSION:   No acute infarct. Moderate periventricular and subcortical white matter chronic microvascular ischemic changes.    --- End of Report ---    < end of copied text >    Meds    MEDICATIONS  (STANDING):  aspirin  chewable 81 milliGRAM(s) Oral daily  atorvastatin 80 milliGRAM(s) Oral at bedtime  losartan 100 milliGRAM(s) Oral daily  metoprolol tartrate 12.5 milliGRAM(s) Oral two times a day  pantoprazole    Tablet 40 milliGRAM(s) Oral before breakfast  rivaroxaban 2.5 milliGRAM(s) Oral two times a day      MEDICATIONS  (PRN):      Physical Exam    Neuro :  no focal deficits  Respiratory: CTA B/L  CV: RRR, S1S2, no murmurs,   Abdominal: Soft, NT, ND +BS,  Extremities: No edema, + peripheral pulses      ASSESSMENT    right tingling r/o cva,   uncontrolled htn with urgency,   r/o acs,   h/o HTN,   HLD   CAD (s/p stent x 1 in LAD 5 years ago) on xeralto  abdominoplasty      PLAN    cont tele,   aspirin, statin,   MRI noted above:  No acute infarct. Moderate periventricular and subcortical white matter chronic microvascular ischemic changes  neuro cons  f/u carotid duplex   ce q8 x3   cardio f/u   f/u echo   cont current meds

## 2021-09-01 NOTE — PROGRESS NOTE ADULT - NUTRITIONAL ASSESSMENT
Diet, Regular:   DASH/TLC {Sodium & Cholesterol Restricted} (08-30-21 @ 21:17) [Active]
Diet, Regular:   DASH/TLC {Sodium & Cholesterol Restricted} (08-30-21 @ 21:17) [Active]

## 2021-09-01 NOTE — PROGRESS NOTE ADULT - ASSESSMENT
71 y/o F from home lives with her daughter with PMH of HTN, HLD and CAD (s/p stent x 1 in LAD 5 years ago) on xeralto,Rt sided numbness.  1.Tele monitoring.  2.Echocardiogram.  3.CAD-asa,xarelto,b blocker,statin.  4.HTN-cozaar, b blocker.  5.Lipid d/o-statin,add vascepa as outpatient.  6.Neurology eval rec MRI.  7.PPI.

## 2021-09-01 NOTE — PROGRESS NOTE ADULT - SUBJECTIVE AND OBJECTIVE BOX
CHIEF COMPLAINT:Patient is a 76y old  Female who presents with a chief complaint of Hypertensive Urgency.Pt appears comfortable.    	  REVIEW OF SYSTEMS:  CONSTITUTIONAL: No fever, weight loss, or fatigue  EYES: No eye pain, visual disturbances, or discharge  ENT:  No difficulty hearing, tinnitus, vertigo; No sinus or throat pain  NECK: No pain or stiffness  RESPIRATORY: No cough, wheezing, chills or hemoptysis; No Shortness of Breath  CARDIOVASCULAR: No chest pain, palpitations, passing out, dizziness, or leg swelling  GASTROINTESTINAL: No abdominal or epigastric pain. No nausea, vomiting, or hematemesis; No diarrhea or constipation. No melena or hematochezia.  GENITOURINARY: No dysuria, frequency, hematuria, or incontinence  NEUROLOGICAL: No headaches, memory loss, loss of strength, numbness, or tremors  SKIN: No itching, burning, rashes, or lesions   LYMPH Nodes: No enlarged glands  ENDOCRINE: No heat or cold intolerance; No hair loss  MUSCULOSKELETAL: No joint pain or swelling; No muscle, back, or extremity pain  PSYCHIATRIC: No depression, anxiety, mood swings, or difficulty sleeping  HEME/LYMPH: No easy bruising, or bleeding gums  ALLERGY AND IMMUNOLOGIC: No hives or eczema	      PHYSICAL EXAM:  T(C): 36.6 (09-01-21 @ 08:13), Max: 36.8 (08-31-21 @ 16:03)  HR: 67 (09-01-21 @ 08:13) (52 - 68)  BP: 125/77 (09-01-21 @ 08:13) (125/77 - 149/67)  RR: 18 (09-01-21 @ 08:13) (17 - 18)  SpO2: 100% (09-01-21 @ 08:13) (97% - 100%)  Wt(kg): --  I&O's Summary      Appearance: Normal	  HEENT:   Normal oral mucosa, PERRL, EOMI	  Lymphatic: No lymphadenopathy  Cardiovascular: Normal S1 S2, No JVD, No murmurs, No edema  Respiratory: Lungs clear to auscultation	  Psychiatry: A & O x 3, Mood & affect appropriate  Gastrointestinal:  Soft, Non-tender, + BS	  Skin: No rashes, No ecchymoses, No cyanosis	  Neurologic: Non-focal  Extremities: Normal range of motion, No clubbing, cyanosis or edema  Vascular: Peripheral pulses palpable 2+ bilaterally    MEDICATIONS  (STANDING):  aspirin  chewable 81 milliGRAM(s) Oral daily  atorvastatin 80 milliGRAM(s) Oral at bedtime  losartan 100 milliGRAM(s) Oral daily  metoprolol tartrate 12.5 milliGRAM(s) Oral two times a day  pantoprazole    Tablet 40 milliGRAM(s) Oral before breakfast  rivaroxaban 2.5 milliGRAM(s) Oral two times a day        	  LABS:	 	                     13.9   6.67  )-----------( 341      ( 01 Sep 2021 06:59 )             42.4     09-01    138  |  105  |  17  ----------------------------<  91  4.2   |  26  |  0.63    Ca    9.1      01 Sep 2021 06:59  Phos  4.1     09-01  Mg     2.5     09-01    TPro  7.6  /  Alb  3.2<L>  /  TBili  0.5  /  DBili  x   /  AST  21  /  ALT  23  /  AlkPhos  79  09-01    proBNP:   Lipid Profile: Cholesterol 280  LDL --  HDL 48      HgA1c:   TSH: Thyroid Stimulating Hormone, Serum: 4.21 uU/mL (08-31 @ 07:37)

## 2021-09-01 NOTE — PROGRESS NOTE ADULT - PROBLEM SELECTOR PLAN 4
Pt has hx of CAD with stent   Takes aspirin and Xarelto at home  Will start home medications
Pt has hx of CAD with stent   Takes aspirin and Xarelto at home  Will start home medications

## 2021-09-01 NOTE — PHYSICAL THERAPY INITIAL EVALUATION ADULT - GENERAL OBSERVATIONS, REHAB EVAL
pt pm team rounds, clear to mobilize, negative imaging, resolving deficits, independent adls and ambulation

## 2021-09-01 NOTE — PROGRESS NOTE ADULT - PROBLEM SELECTOR PLAN 2
Pt presented with 2 days of paresthesias   CT head: Age-appropriate involutional change and microvascular ischemic disease age indeterminate. No evidence of large vessel occlusion. No intracranial hemorrhage.  - MRI - as above.   Symptoms resolved now  Rt UE strength 4/5   S/p aspirin in ED  Will start on high dose atorvastatin, aspirin and continue home medication Xarelto  Neurology consulted - Dr. Chance. Pt presented with 2 days of paresthesias   CT head: Age-appropriate involutional change and microvascular ischemic disease age indeterminate. No evidence of large vessel occlusion. No intracranial hemorrhage.  - MRI - as above.   Symptoms resolved now  Rt UE strength 4/5   S/p aspirin in ED  Will start on high dose atorvastatin, aspirin and continue home medication Xarelto  - MRI as above.   - U/S as above.   Neurology consulted - Dr. Chance.

## 2021-09-02 ENCOUNTER — TRANSCRIPTION ENCOUNTER (OUTPATIENT)
Age: 76
End: 2021-09-02

## 2021-09-02 VITALS
TEMPERATURE: 98 F | SYSTOLIC BLOOD PRESSURE: 145 MMHG | OXYGEN SATURATION: 100 % | HEART RATE: 58 BPM | DIASTOLIC BLOOD PRESSURE: 72 MMHG | RESPIRATION RATE: 17 BRPM

## 2021-09-02 LAB
ALBUMIN SERPL ELPH-MCNC: 3.3 G/DL — LOW (ref 3.5–5)
ALP SERPL-CCNC: 74 U/L — SIGNIFICANT CHANGE UP (ref 40–120)
ALT FLD-CCNC: 21 U/L DA — SIGNIFICANT CHANGE UP (ref 10–60)
ANION GAP SERPL CALC-SCNC: 9 MMOL/L — SIGNIFICANT CHANGE UP (ref 5–17)
AST SERPL-CCNC: 17 U/L — SIGNIFICANT CHANGE UP (ref 10–40)
BASOPHILS # BLD AUTO: 0.07 K/UL — SIGNIFICANT CHANGE UP (ref 0–0.2)
BASOPHILS NFR BLD AUTO: 1.1 % — SIGNIFICANT CHANGE UP (ref 0–2)
BILIRUB SERPL-MCNC: 0.5 MG/DL — SIGNIFICANT CHANGE UP (ref 0.2–1.2)
BUN SERPL-MCNC: 21 MG/DL — HIGH (ref 7–18)
CALCIUM SERPL-MCNC: 8.7 MG/DL — SIGNIFICANT CHANGE UP (ref 8.4–10.5)
CHLORIDE SERPL-SCNC: 106 MMOL/L — SIGNIFICANT CHANGE UP (ref 96–108)
CO2 SERPL-SCNC: 24 MMOL/L — SIGNIFICANT CHANGE UP (ref 22–31)
CREAT SERPL-MCNC: 0.57 MG/DL — SIGNIFICANT CHANGE UP (ref 0.5–1.3)
EOSINOPHIL # BLD AUTO: 0.22 K/UL — SIGNIFICANT CHANGE UP (ref 0–0.5)
EOSINOPHIL NFR BLD AUTO: 3.4 % — SIGNIFICANT CHANGE UP (ref 0–6)
GLUCOSE SERPL-MCNC: 85 MG/DL — SIGNIFICANT CHANGE UP (ref 70–99)
HCT VFR BLD CALC: 40.9 % — SIGNIFICANT CHANGE UP (ref 34.5–45)
HGB BLD-MCNC: 13.5 G/DL — SIGNIFICANT CHANGE UP (ref 11.5–15.5)
IMM GRANULOCYTES NFR BLD AUTO: 0.3 % — SIGNIFICANT CHANGE UP (ref 0–1.5)
LYMPHOCYTES # BLD AUTO: 3.22 K/UL — SIGNIFICANT CHANGE UP (ref 1–3.3)
LYMPHOCYTES # BLD AUTO: 49.5 % — HIGH (ref 13–44)
MAGNESIUM SERPL-MCNC: 2.4 MG/DL — SIGNIFICANT CHANGE UP (ref 1.6–2.6)
MCHC RBC-ENTMCNC: 29.8 PG — SIGNIFICANT CHANGE UP (ref 27–34)
MCHC RBC-ENTMCNC: 33 GM/DL — SIGNIFICANT CHANGE UP (ref 32–36)
MCV RBC AUTO: 90.3 FL — SIGNIFICANT CHANGE UP (ref 80–100)
MONOCYTES # BLD AUTO: 0.5 K/UL — SIGNIFICANT CHANGE UP (ref 0–0.9)
MONOCYTES NFR BLD AUTO: 7.7 % — SIGNIFICANT CHANGE UP (ref 2–14)
NEUTROPHILS # BLD AUTO: 2.48 K/UL — SIGNIFICANT CHANGE UP (ref 1.8–7.4)
NEUTROPHILS NFR BLD AUTO: 38 % — LOW (ref 43–77)
NRBC # BLD: 0 /100 WBCS — SIGNIFICANT CHANGE UP (ref 0–0)
PHOSPHATE SERPL-MCNC: 3.8 MG/DL — SIGNIFICANT CHANGE UP (ref 2.5–4.5)
PLATELET # BLD AUTO: 313 K/UL — SIGNIFICANT CHANGE UP (ref 150–400)
POTASSIUM SERPL-MCNC: 4.2 MMOL/L — SIGNIFICANT CHANGE UP (ref 3.5–5.3)
POTASSIUM SERPL-SCNC: 4.2 MMOL/L — SIGNIFICANT CHANGE UP (ref 3.5–5.3)
PROT SERPL-MCNC: 7.5 G/DL — SIGNIFICANT CHANGE UP (ref 6–8.3)
RBC # BLD: 4.53 M/UL — SIGNIFICANT CHANGE UP (ref 3.8–5.2)
RBC # FLD: 12.4 % — SIGNIFICANT CHANGE UP (ref 10.3–14.5)
SODIUM SERPL-SCNC: 139 MMOL/L — SIGNIFICANT CHANGE UP (ref 135–145)
WBC # BLD: 6.51 K/UL — SIGNIFICANT CHANGE UP (ref 3.8–10.5)
WBC # FLD AUTO: 6.51 K/UL — SIGNIFICANT CHANGE UP (ref 3.8–10.5)

## 2021-09-02 PROCEDURE — 80061 LIPID PANEL: CPT

## 2021-09-02 PROCEDURE — 93005 ELECTROCARDIOGRAM TRACING: CPT

## 2021-09-02 PROCEDURE — 86769 SARS-COV-2 COVID-19 ANTIBODY: CPT

## 2021-09-02 PROCEDURE — 97161 PT EVAL LOW COMPLEX 20 MIN: CPT

## 2021-09-02 PROCEDURE — 93880 EXTRACRANIAL BILAT STUDY: CPT

## 2021-09-02 PROCEDURE — 83735 ASSAY OF MAGNESIUM: CPT

## 2021-09-02 PROCEDURE — 99285 EMERGENCY DEPT VISIT HI MDM: CPT

## 2021-09-02 PROCEDURE — 84443 ASSAY THYROID STIM HORMONE: CPT

## 2021-09-02 PROCEDURE — 99232 SBSQ HOSP IP/OBS MODERATE 35: CPT

## 2021-09-02 PROCEDURE — 82962 GLUCOSE BLOOD TEST: CPT

## 2021-09-02 PROCEDURE — 85027 COMPLETE CBC AUTOMATED: CPT

## 2021-09-02 PROCEDURE — 70450 CT HEAD/BRAIN W/O DYE: CPT | Mod: MA

## 2021-09-02 PROCEDURE — 93306 TTE W/DOPPLER COMPLETE: CPT

## 2021-09-02 PROCEDURE — 84100 ASSAY OF PHOSPHORUS: CPT

## 2021-09-02 PROCEDURE — 80053 COMPREHEN METABOLIC PANEL: CPT

## 2021-09-02 PROCEDURE — 85025 COMPLETE CBC W/AUTO DIFF WBC: CPT

## 2021-09-02 PROCEDURE — 70551 MRI BRAIN STEM W/O DYE: CPT

## 2021-09-02 PROCEDURE — 83036 HEMOGLOBIN GLYCOSYLATED A1C: CPT

## 2021-09-02 PROCEDURE — 36415 COLL VENOUS BLD VENIPUNCTURE: CPT

## 2021-09-02 PROCEDURE — 87635 SARS-COV-2 COVID-19 AMP PRB: CPT

## 2021-09-02 RX ORDER — METOPROLOL TARTRATE 50 MG
1 TABLET ORAL
Qty: 30 | Refills: 0
Start: 2021-09-02 | End: 2021-10-01

## 2021-09-02 RX ORDER — FLUTICASONE PROPIONATE 50 MCG
1 SPRAY, SUSPENSION NASAL
Refills: 0 | Status: DISCONTINUED | OUTPATIENT
Start: 2021-09-02 | End: 2021-09-02

## 2021-09-02 RX ADMIN — Medication 81 MILLIGRAM(S): at 11:31

## 2021-09-02 RX ADMIN — Medication 12.5 MILLIGRAM(S): at 05:39

## 2021-09-02 RX ADMIN — LOSARTAN POTASSIUM 100 MILLIGRAM(S): 100 TABLET, FILM COATED ORAL at 05:39

## 2021-09-02 RX ADMIN — PANTOPRAZOLE SODIUM 40 MILLIGRAM(S): 20 TABLET, DELAYED RELEASE ORAL at 05:40

## 2021-09-02 RX ADMIN — RIVAROXABAN 2.5 MILLIGRAM(S): KIT at 05:40

## 2021-09-02 NOTE — PROGRESS NOTE ADULT - SUBJECTIVE AND OBJECTIVE BOX
CHIEF COMPLAINT:Patient is a 76y old  Female who presents with a chief complaint of Hypertensive Urgency.Pt appears comfortable.    	  REVIEW OF SYSTEMS:  CONSTITUTIONAL: No fever, weight loss, or fatigue  EYES: No eye pain, visual disturbances, or discharge  ENT:  No difficulty hearing, tinnitus, vertigo; No sinus or throat pain  NECK: No pain or stiffness  RESPIRATORY: No cough, wheezing, chills or hemoptysis; No Shortness of Breath  CARDIOVASCULAR: No chest pain, palpitations, passing out, dizziness, or leg swelling  GASTROINTESTINAL: No abdominal or epigastric pain. No nausea, vomiting, or hematemesis; No diarrhea or constipation. No melena or hematochezia.  GENITOURINARY: No dysuria, frequency, hematuria, or incontinence  NEUROLOGICAL: No headaches, memory loss, loss of strength, numbness, or tremors  SKIN: No itching, burning, rashes, or lesions   LYMPH Nodes: No enlarged glands  ENDOCRINE: No heat or cold intolerance; No hair loss  MUSCULOSKELETAL: No joint pain or swelling; No muscle, back, or extremity pain  PSYCHIATRIC: No depression, anxiety, mood swings, or difficulty sleeping  HEME/LYMPH: No easy bruising, or bleeding gums  ALLERGY AND IMMUNOLOGIC: No hives or eczema	      PHYSICAL EXAM:  T(C): 36.2 (09-02-21 @ 08:31), Max: 36.8 (09-01-21 @ 16:27)  HR: 55 (09-02-21 @ 08:31) (55 - 64)  BP: 142/77 (09-02-21 @ 08:31) (126/68 - 164/78)  RR: 17 (09-02-21 @ 08:31) (17 - 19)  SpO2: 98% (09-02-21 @ 08:31) (97% - 100%)  Wt(kg): --  I&O's Summary    01 Sep 2021 07:01  -  02 Sep 2021 07:00  --------------------------------------------------------  IN: 225 mL / OUT: 0 mL / NET: 225 mL        Appearance: Normal	  HEENT:   Normal oral mucosa, PERRL, EOMI	  Lymphatic: No lymphadenopathy  Cardiovascular: Normal S1 S2, No JVD, No murmurs, No edema  Respiratory: Lungs clear to auscultation	  Psychiatry: A & O x 3, Mood & affect appropriate  Gastrointestinal:  Soft, Non-tender, + BS	  Skin: No rashes, No ecchymoses, No cyanosis	  Neurologic: Non-focal  Extremities: Normal range of motion, No clubbing, cyanosis or edema  Vascular: Peripheral pulses palpable 2+ bilaterally    MEDICATIONS  (STANDING):  aspirin  chewable 81 milliGRAM(s) Oral daily  atorvastatin 80 milliGRAM(s) Oral at bedtime  fluticasone propionate 50 MICROgram(s)/spray Nasal Spray 1 Spray(s) Both Nostrils two times a day  losartan 100 milliGRAM(s) Oral daily  metoprolol tartrate 12.5 milliGRAM(s) Oral two times a day  pantoprazole    Tablet 40 milliGRAM(s) Oral before breakfast  rivaroxaban 2.5 milliGRAM(s) Oral two times a day      TELEMETRY: 	nsr    	  	  LABS:	 	                        13.5   6.51  )-----------( 313      ( 02 Sep 2021 08:25 )             40.9     09-02    139  |  106  |  21<H>  ----------------------------<  85  4.2   |  24  |  0.57    Ca    8.7      02 Sep 2021 08:25  Phos  3.8     09-02  Mg     2.4     09-02    TPro  7.5  /  Alb  3.3<L>  /  TBili  0.5  /  DBili  x   /  AST  17  /  ALT  21  /  AlkPhos  74  09-02    proBNP:   Lipid Profile: Cholesterol 280  LDL --  HDL 48        TSH: Thyroid Stimulating Hormone, Serum: 4.21 uU/mL (08-31 @ 07:37)      	  < from: MR Head No Cont (09.01.21 @ 10:43) >  EXAM:  MR BRAIN                            PROCEDURE DATE:  09/01/2021          INTERPRETATION:  Exam Date: 9/1/2021 10:43 AM    MR brain  without gadolinium    CLINICAL INFORMATION: Altered mental status r/o stroke    TECHNIQUE:   Sagittal and axial T1-weighted images, axial FLAIR images, gradient echo, axial  T2-weighted images and axial diffusion weighted images of the brain were obtained.    FINDINGS: Comparison to CT head of 8/30/2021.    There is no evidence of acute infarct, hemorrhage, or mass lesion. There are scattered foci of FLAIR hyperintensity in the periventricular and subcortical white matter of the bilateral cerebri, compatible with moderate chronic microvascular ischemic changes. There is no midline shift or herniation pattern. No mass effect is found in the brain.    The ventricles, sulci and basal cisterns appear unremarkable.    The vertebral and internal carotid arteries demonstrate expected flow voids indicating their patency.    The paranasal sinuses are clear.    IMPRESSION:   No acute infarct. Moderate periventricular and subcortical white matter chronic microvascular ischemic changes.    --- End of Report ---            NUZHAT NEVAREZ MD; Attending Radiologist  This document has been electronically signed. Sep  1 2021 10:48AM

## 2021-09-02 NOTE — PROGRESS NOTE ADULT - SUBJECTIVE AND OBJECTIVE BOX
Patient is a 76y old  Female who presents with a chief complaint of Hypertensive Urgency (01 Sep 2021 12:05)    pt seen in icu [  ], reg med floor [ x  ], bed [ x ], chair at bedside [   ], a+o x3 [ x ], lethargic [  ],    nad [ x ]        Allergies    No Known Allergies        Vitals    T(F): 98.2 (09-02-21 @ 04:34), Max: 98.2 (09-01-21 @ 16:27)  HR: 64 (09-02-21 @ 04:34) (58 - 67)  BP: 126/68 (09-02-21 @ 04:34) (125/77 - 164/78)  RR: 17 (09-02-21 @ 04:34) (17 - 19)  SpO2: 100% (09-02-21 @ 04:34) (97% - 100%)  Wt(kg): --  CAPILLARY BLOOD GLUCOSE          Labs                          13.9   6.67  )-----------( 341      ( 01 Sep 2021 06:59 )             42.4       09-01    138  |  105  |  17  ----------------------------<  91  4.2   |  26  |  0.63    Ca    9.1      01 Sep 2021 06:59  Phos  4.1     09-01  Mg     2.5     09-01    TPro  7.6  /  Alb  3.2<L>  /  TBili  0.5  /  DBili  x   /  AST  21  /  ALT  23  /  AlkPhos  79  09-01                Radiology Results      Meds    MEDICATIONS  (STANDING):  aspirin  chewable 81 milliGRAM(s) Oral daily  atorvastatin 80 milliGRAM(s) Oral at bedtime  losartan 100 milliGRAM(s) Oral daily  metoprolol tartrate 12.5 milliGRAM(s) Oral two times a day  pantoprazole    Tablet 40 milliGRAM(s) Oral before breakfast  rivaroxaban 2.5 milliGRAM(s) Oral two times a day      MEDICATIONS  (PRN):      Physical Exam    Neuro :  no focal deficits  Respiratory: CTA B/L  CV: RRR, S1S2, no murmurs,   Abdominal: Soft, NT, ND +BS,  Extremities: No edema, + peripheral pulses      ASSESSMENT    right tingling r/o cva,   uncontrolled htn with urgency,   r/o acs,   h/o HTN,   HLD   CAD (s/p stent x 1 in LAD 5 years ago) on xeralto  abdominoplasty      PLAN    cont tele,   aspirin, statin,   MRI noted above:  No acute infarct. Moderate periventricular and subcortical white matter chronic microvascular ischemic changes  neuro cons  f/u carotid duplex   ce q8 x3   cardio f/u   f/u echo   cont current meds      Patient is a 76y old  Female who presents with a chief complaint of Hypertensive Urgency (01 Sep 2021 12:05)    pt seen in icu [  ], reg med floor [ x  ], bed [ x ], chair at bedside [   ], a+o x3 [ x ], lethargic [  ],    nad [ x ]        Allergies    No Known Allergies        Vitals    T(F): 98.2 (09-02-21 @ 04:34), Max: 98.2 (09-01-21 @ 16:27)  HR: 64 (09-02-21 @ 04:34) (58 - 67)  BP: 126/68 (09-02-21 @ 04:34) (125/77 - 164/78)  RR: 17 (09-02-21 @ 04:34) (17 - 19)  SpO2: 100% (09-02-21 @ 04:34) (97% - 100%)  Wt(kg): --  CAPILLARY BLOOD GLUCOSE          Labs                          13.9   6.67  )-----------( 341      ( 01 Sep 2021 06:59 )             42.4       09-01    138  |  105  |  17  ----------------------------<  91  4.2   |  26  |  0.63    Ca    9.1      01 Sep 2021 06:59  Phos  4.1     09-01  Mg     2.5     09-01    TPro  7.6  /  Alb  3.2<L>  /  TBili  0.5  /  DBili  x   /  AST  21  /  ALT  23  /  AlkPhos  79  09-01                Radiology Results    < from: US Duplex Carotid Arteries Complete, Bilateral (09.01.21 @ 11:19) >  IMPRESSION:    1.  Right carotid artery: No evidence of hemodynamically significant stenosis.  2.  Left carotid artery: No evidence of hemodynamically significant stenosis.  3.  Vertebral arteries: Antegrade flow.    < end of copied text >        Meds    MEDICATIONS  (STANDING):  aspirin  chewable 81 milliGRAM(s) Oral daily  atorvastatin 80 milliGRAM(s) Oral at bedtime  losartan 100 milliGRAM(s) Oral daily  metoprolol tartrate 12.5 milliGRAM(s) Oral two times a day  pantoprazole    Tablet 40 milliGRAM(s) Oral before breakfast  rivaroxaban 2.5 milliGRAM(s) Oral two times a day      MEDICATIONS  (PRN):      Physical Exam    Neuro :  no focal deficits  Respiratory: CTA B/L  CV: RRR, S1S2, no murmurs,   Abdominal: Soft, NT, ND +BS,  Extremities: No edema, + peripheral pulses      ASSESSMENT    right tingling r/o cva,   uncontrolled htn with urgency,   r/o acs,   h/o HTN,   HLD   CAD (s/p stent x 1 in LAD 5 years ago) on xeralto  abdominoplasty      PLAN    cont tele,   statin,   MRI noted above:  No acute infarct. Moderate periventricular and subcortical white matter chronic microvascular ischemic changes  neuro f/u   Right face, arm and leg tingling in setting of HTN concerning for hypertensive urgency, symptoms resolved with the control of the BP   carotid duplex with No evidence of hemodynamically significant stenosis noted above.  cardio f/u   f/u echo   cont  xarelto, cozaar, lopressor.  add vascepa as outpatient  add fluticasone nasal sproy b/l for sinusitis   pt to f/u outpt for nerve conduction studies   pt to f/u ent outpt  cont current meds   d/c plan pending echo

## 2021-09-02 NOTE — DISCHARGE NOTE PROVIDER - NSDCCPCAREPLAN_GEN_ALL_CORE_FT
PRINCIPAL DISCHARGE DIAGNOSIS  Diagnosis: Transient ischemic attack (TIA)  Assessment and Plan of Treatment: You came into the hospital you had high blood pressure. The high blood pressure we are not sure how long you have had for which has cuased changes on the MRI in the brain. This could have been the cause of the weakness you had. The weakness has improved since you have been in the hospital. We have also had your blood pressure in better control. Please follow up with the neurologist in 2 weeks. Please follow up with your primary care doctor within 2 weeks of discharge.        SECONDARY DISCHARGE DIAGNOSES  Diagnosis: Hypertensive urgency  Assessment and Plan of Treatment: You have high blood pressure. Please continue to taking your medications as prescribed. Please measure your blood pressure at least once daily. Maintain a healthy diet which includes incorporating more vegetables and decreasing the amount of salt (low sodium) and sugar in your diet such as rice, bread, and pasta low sugar, low fat, low sodium diet. Exercise frequently if possible.  Please follow up with your primary care provider within one week of your discharge.      Diagnosis: CAD (coronary artery disease)  Assessment and Plan of Treatment: You have a hisotory of this disease. While your in the hospital we cont    Diagnosis: HLD (hyperlipidemia)  Assessment and Plan of Treatment: You have hyperlipidemia. Please continue to take your cholesterol medications. Maintain a healthy diet that consist of low sugar, low fat, low sodium. Decrease the amount of fried foods that you consume. Exercise frequently if possible. Please follow up with  your primary care provider within 1 week of your discharge.  You are recommended a DASH Diet that emphasizes mostly vegetables, fruits, and fat-free or low-fat dairy products. Please limit intake of sodium, sweets, beverages w/ high sugar/carbohydrate content.       PRINCIPAL DISCHARGE DIAGNOSIS  Diagnosis: Transient ischemic attack (TIA)  Assessment and Plan of Treatment: You came into the hospital you had high blood pressure. The high blood pressure we are not sure how long you have had for which has cuased changes on the MRI in the brain. This could have been the cause of the weakness you had. The weakness has improved since you have been in the hospital. We have also had your blood pressure in better control. Please follow up with the neurologist in 2 weeks. Please follow up with your primary care doctor within 2 weeks of discharge.        SECONDARY DISCHARGE DIAGNOSES  Diagnosis: Hypertensive urgency  Assessment and Plan of Treatment: You have high blood pressure. Please continue to taking your medications as prescribed. Please measure your blood pressure at least once daily. Maintain a healthy diet which includes incorporating more vegetables and decreasing the amount of salt (low sodium) and sugar in your diet such as rice, bread, and pasta low sugar, low fat, low sodium diet. Exercise frequently if possible.  Please follow up with your primary care provider within one week of your discharge.      Diagnosis: CAD (coronary artery disease)  Assessment and Plan of Treatment: You have a hisotory of this disease. While your in the hospital we continued your medications    Diagnosis: HLD (hyperlipidemia)  Assessment and Plan of Treatment: You have hyperlipidemia. Please continue to take your cholesterol medications. Maintain a healthy diet that consist of low sugar, low fat, low sodium. Decrease the amount of fried foods that you consume. Exercise frequently if possible. Please follow up with  your primary care provider within 1 week of your discharge.  You are recommended a DASH Diet that emphasizes mostly vegetables, fruits, and fat-free or low-fat dairy products. Please limit intake of sodium, sweets, beverages w/ high sugar/carbohydrate content.       PRINCIPAL DISCHARGE DIAGNOSIS  Diagnosis: Transient ischemic attack (TIA)  Assessment and Plan of Treatment: You came into the hospital you had high blood pressure. The high blood pressure we are not sure how long you have had for which has cuased changes on the MRI in the brain. This could have been the cause of the weakness you had. The weakness has improved since you have been in the hospital. We have also had your blood pressure in better control. Please follow up with the neurologist in 2 weeks. Please follow up with your primary care doctor within 2 weeks of discharge.        SECONDARY DISCHARGE DIAGNOSES  Diagnosis: Hypertensive urgency  Assessment and Plan of Treatment: You have high blood pressure. Please continue to taking your medications as prescribed. Please measure your blood pressure at least once daily. Maintain a healthy diet which includes incorporating more vegetables and decreasing the amount of salt (low sodium) and sugar in your diet such as rice, bread, and pasta low sugar, low fat, low sodium diet. Exercise frequently if possible.  Please follow up with your primary care provider within one week of your discharge.      Diagnosis: CAD (coronary artery disease)  Assessment and Plan of Treatment: You have a hisotory of this disease. While your in the hospital we continued your medications. Please follow up with your primary care doctor within 2 weeks of discharge.      Diagnosis: HLD (hyperlipidemia)  Assessment and Plan of Treatment: You have hyperlipidemia. Please continue to take your cholesterol medications. Maintain a healthy diet that consist of low sugar, low fat, low sodium. Decrease the amount of fried foods that you consume. Exercise frequently if possible. Please follow up with  your primary care provider within 1 week of your discharge.  You are recommended a DASH Diet that emphasizes mostly vegetables, fruits, and fat-free or low-fat dairy products. Please limit intake of sodium, sweets, beverages w/ high sugar/carbohydrate content.

## 2021-09-02 NOTE — DISCHARGE NOTE PROVIDER - CARE PROVIDER_API CALL
Alisia Chance  NEUROLOGY  [address] [place] [address]  Phone: [phone]  Fax: [phone]  Follow Up Time:     Funmi Ortega  INTERNAL MEDICINE  [address] [place] [address]  Phone: [phone]  Fax: [phone]  Follow Up Time:

## 2021-09-02 NOTE — DISCHARGE NOTE PROVIDER - NSDCMRMEDTOKEN_GEN_ALL_CORE_FT
amLODIPine 10 mg oral tablet: 1 tab(s) orally once a day  aspirin 81 mg oral tablet: 1 tab(s) orally once a day  atorvastatin 80 mg oral tablet: 1 tab(s) orally once a day  losartan 100 mg oral tablet: 1 tab(s) orally once a day  metoprolol 12.5m tab(s) orally 2 times a day   pantoprazole 40 mg oral delayed release tablet: 1 tab(s) orally once a day  Xarelto 2.5 mg oral tablet: 1 tab(s) orally 2 times a day  Zetia 10 mg oral tablet: 1 tab(s) orally once a day

## 2021-09-02 NOTE — PROGRESS NOTE ADULT - PROVIDER SPECIALTY LIST ADULT
Cardiology
Internal Medicine
Internal Medicine
Cardiology
Internal Medicine
Internal Medicine
Neurology
Internal Medicine

## 2021-09-02 NOTE — PROGRESS NOTE ADULT - SUBJECTIVE AND OBJECTIVE BOX
***TEMPLATE ONLY***      Patient is a 76y old  Female who presents with a chief complaint of Hypertensive Urgency (02 Sep 2021 09:48)      HPI:  73 y/o F from home lives with her daughter with PMH of HTN, HLD and CAD (s/p stent x 1 in LAD 5 years ago) on xeralto. Pt was sent from her PCP Dr Wynn for Hypertensive urgency with a blood pressure of 180/88. Pt was at Dr. Wynn's office because 2 days ago she developed a pull aching pain on the right side of her neck which resolved in 3 hours but was followed by a tingling sensation in the right side of her body from head to toe. The sensation worsened yesterday but has now completely resolved. She denies any weakness or numbness during this time. She denies difficulty ambulating, dysarthria, vision changes, headache, LOC or any other complaints. She states all her symptoms have completely resolved and is back at her usual state of health.     Patient has a right facial droop, however, daughter present at bedside states it has always been like that  (30 Aug 2021 22:07)         Neurological Review of Systems:  No difficulty with language.  No vision loss or double vision.  No dizziness, vertigo or new hearing loss.  No difficulty with speech or swallowing.  No focal weakness.  No focal sensory changes.  No numbness or tingling in the bilateral lower extremities.  No difficulty with balance.  No difficulty with ambulation.        MEDICATIONS  (STANDING):  aspirin  chewable 81 milliGRAM(s) Oral daily  atorvastatin 80 milliGRAM(s) Oral at bedtime  fluticasone propionate 50 MICROgram(s)/spray Nasal Spray 1 Spray(s) Both Nostrils two times a day  losartan 100 milliGRAM(s) Oral daily  metoprolol tartrate 12.5 milliGRAM(s) Oral two times a day  pantoprazole    Tablet 40 milliGRAM(s) Oral before breakfast  rivaroxaban 2.5 milliGRAM(s) Oral two times a day    MEDICATIONS  (PRN):    Allergies    No Known Allergies    Intolerances      PAST MEDICAL & SURGICAL HISTORY:  HTN (hypertension)    Coronary artery disease involving native coronary artery of native heart without angina pectoris    HLD (hyperlipidemia)    H/O abdominoplasty    Status post arterial stent      FAMILY HISTORY:    SOCIAL HISTORY: non smoker/ former smoker/ active smoker    Review of Systems:  Constitutional: No generalized weakness. No fevers or chills.                    Eyes, Ears, Mouth, Throat: No vision loss   Respiratory: No shortness of breath or cough.                                Cardiovascular: No chest pain or palpitations  Gastrointestinal: No nausea or vomiting.                                         Genitourinary: No urinary incontinence or burning on urination.  Musculoskeletal: No joint pain.                                                           Dermatologic: No rash.  Neurological: as per HPI                                                                      Psychiatric: No behavioral problems.  Endocrine: No known hypoglycemia.               Hematologic/Lymphatic: No easy bleeding.    O:  Vital Signs Last 24 Hrs  T(C): 36.2 (02 Sep 2021 08:31), Max: 36.8 (01 Sep 2021 16:27)  T(F): 97.1 (02 Sep 2021 08:31), Max: 98.2 (01 Sep 2021 16:27)  HR: 55 (02 Sep 2021 08:31) (55 - 64)  BP: 142/77 (02 Sep 2021 08:31) (126/68 - 164/78)  BP(mean): --  RR: 17 (02 Sep 2021 08:31) (17 - 19)  SpO2: 98% (02 Sep 2021 08:31) (97% - 100%)    General Exam:   General appearance: No acute distress                 Cardiovascular: Pedal dorsalis pulses intact bilaterally    Mental Status: Orientated to self, date and place.  Attention intact.  No dysarthria, aphasia or neglect.  Knowledge intact.  Registration intact.  Short and long term memory grossly intact.      Cranial Nerves: CN I - not tested.  PERRL, EOMI, VFF, no nystagmus or diplopia.  No APD.  Fundi not visualized.  CN V1-3 intact to light touch and pinprick.  No facial asymmetry.  Hearing intact to finger rub bilaterally.  Tongue, uvula and palate midline.  Sternocleidomastoid and Trapezius intact bilaterally.    Motor:   Tone: normal.                  Strength intact throughout  No pronator drift bilaterally                      No dysmetria on finger-nose-finger or heel-shin-heel  No truncal ataxia.  No resting, postural or action tremor.  No myoclonus.    Sensation: intact to light touch, pinprick, vibration and proprioception    Deep Tendon Reflexes: 1+ bilateral biceps, triceps, brachioradialis, knee and ankle  Toes flexor bilaterally    Gait: normal and stable.  Rhomberg -anthony.    Other:     LABS:                        13.5   6.51  )-----------( 313      ( 02 Sep 2021 08:25 )             40.9     09-02    139  |  106  |  21<H>  ----------------------------<  85  4.2   |  24  |  0.57    Ca    8.7      02 Sep 2021 08:25  Phos  3.8     09-02  Mg     2.4     09-02    TPro  7.5  /  Alb  3.3<L>  /  TBili  0.5  /  DBili  x   /  AST  17  /  ALT  21  /  AlkPhos  74  09-02            RADIOLOGY & ADDITIONAL STUDIES:    < from: MR Head No Cont (09.01.21 @ 10:43) >    IMPRESSION:   No acute infarct. Moderate periventricular and subcortical white matter chronic microvascular ischemic changes.    < end of copied text >  < from: US Duplex Carotid Arteries Complete, Bilateral (09.01.21 @ 11:19) >  IMPRESSION:    1.  Right carotid artery: No evidence of hemodynamically significant stenosis.  2.  Left carotid artery: No evidence of hemodynamically significant stenosis.  3.  Vertebral arteries: Antegrade flow.    < end of copied text >         Patient is a 76y old  Female who presents with a chief complaint of Hypertensive Urgency (02 Sep 2021 09:48)      S: patient feels well  no c/o  no tingling    MEDICATIONS  (STANDING):  aspirin  chewable 81 milliGRAM(s) Oral daily  atorvastatin 80 milliGRAM(s) Oral at bedtime  fluticasone propionate 50 MICROgram(s)/spray Nasal Spray 1 Spray(s) Both Nostrils two times a day  losartan 100 milliGRAM(s) Oral daily  metoprolol tartrate 12.5 milliGRAM(s) Oral two times a day  pantoprazole    Tablet 40 milliGRAM(s) Oral before breakfast  rivaroxaban 2.5 milliGRAM(s) Oral two times a day    MEDICATIONS  (PRN):    Allergies    No Known Allergies    Intolerances      Review of Systems:  Constitutional: No fevers or chills.                    Respiratory: No cough.                                  O:  Vital Signs Last 24 Hrs  T(C): 36.2 (02 Sep 2021 08:31), Max: 36.8 (01 Sep 2021 16:27)  T(F): 97.1 (02 Sep 2021 08:31), Max: 98.2 (01 Sep 2021 16:27)  HR: 55 (02 Sep 2021 08:31) (55 - 64)  BP: 142/77 (02 Sep 2021 08:31) (126/68 - 164/78)  BP(mean): --  RR: 17 (02 Sep 2021 08:31) (17 - 19)  SpO2: 98% (02 Sep 2021 08:31) (97% - 100%)    General Exam:   General appearance: No acute distress                 Cardiovascular: Pedal dorsalis pulses intact bilaterally    Mental Status: Orientated to self, date and place.  Attention intact.  No dysarthria, aphasia or neglect.   Cranial Nerves: CN I - not tested.  PERRL, EOMI, VFF, no nystagmus or diplopia.  No APD.  Fundi not visualized.  CN V1-3 intact to light touch  + left NLF (old)    Motor:   Tone: normal.                  Strength intact throughout  Other: NIHSS 1, MRS 0    LABS:                        13.5   6.51  )-----------( 313      ( 02 Sep 2021 08:25 )             40.9     09-02    139  |  106  |  21<H>  ----------------------------<  85  4.2   |  24  |  0.57    Ca    8.7      02 Sep 2021 08:25  Phos  3.8     09-02  Mg     2.4     09-02    TPro  7.5  /  Alb  3.3<L>  /  TBili  0.5  /  DBili  x   /  AST  17  /  ALT  21  /  AlkPhos  74  09-02            RADIOLOGY & ADDITIONAL STUDIES:    < from: MR Head No Cont (09.01.21 @ 10:43) >    IMPRESSION:   No acute infarct. Moderate periventricular and subcortical white matter chronic microvascular ischemic changes.    < end of copied text >  < from: US Duplex Carotid Arteries Complete, Bilateral (09.01.21 @ 11:19) >  IMPRESSION:    1.  Right carotid artery: No evidence of hemodynamically significant stenosis.  2.  Left carotid artery: No evidence of hemodynamically significant stenosis.  3.  Vertebral arteries: Antegrade flow.    < end of copied text >

## 2021-09-02 NOTE — DISCHARGE NOTE PROVIDER - HOSPITAL COURSE
HPI:  71 y/o F from home lives with her daughter with PMH of HTN, HLD and CAD (s/p stent x 1 in LAD 5 years ago) on xeralto. Pt was sent from her PCP Dr Wynn for Hypertensive urgency with a blood pressure of 180/88. Pt was at Dr. Wynn's office because 2 days ago she developed a pull aching pain on the right side of her neck which resolved in 3 hours but was followed by a tingling sensation in the right side of her body from head to toe. The sensation worsened yesterday but has now completely resolved. She denies any weakness or numbness during this time. She denies difficulty ambulating, dysarthria, vision changes, headache, LOC or any other complaints. She states all her symptoms have completely resolved and is back at her usual state of health. Patient has a right facial droop, however, daughter present at bedside states it has always been like that. While the pt was in the   hospital her blood pressure was better controlled. She was found to be 202/92 in the ED then she was monitored and medication started she was monitered and her BP was stabilized around 126/68. The pt has a history of paresthesia neurology was consulted. MRI was done and carotid U/S which didn't show acute changes or blockages. Please follow up with your primary care doctor within 2 weeks of discharge.

## 2021-09-02 NOTE — DISCHARGE NOTE PROVIDER - CARE PROVIDERS DIRECT ADDRESSES
[email or web address]

## 2021-09-02 NOTE — PROGRESS NOTE ADULT - ASSESSMENT
Right face, arm and leg tingling in setting of HTN concerning for hypertensive urgency, symptoms resolved with the control of the BP   old right facial weakness    Recommend to get MRI brain to r/o cva  BP control as per primary team    dw resident and Dr. Butterfield Right face, arm and leg tingling in setting of HTN concerning for TIA    will recommend to cw asa and statin, add plavix x 3 weeks  BP goal of  normal  fu with neuro in 2 weeks    dw resident and Dr. Butterfield

## 2021-09-02 NOTE — PROGRESS NOTE ADULT - REASON FOR ADMISSION
Hypertensive Urgency

## 2021-09-02 NOTE — DISCHARGE NOTE NURSING/CASE MANAGEMENT/SOCIAL WORK - PATIENT PORTAL LINK FT
You can access the FollowMyHealth Patient Portal offered by [facility] by registering at the following website: [email or web address]. By joining [employer]’s FollowMyHealth portal, you will also be able to view your health information using other applications (apps) compatible with our system.

## 2021-09-02 NOTE — PROGRESS NOTE ADULT - ASSESSMENT
73 y/o F from home lives with her daughter with PMH of HTN, HLD and CAD (s/p stent x 1 in LAD 5 years ago) on xeralto,Rt sided numbness.  1.D/C Tele monitoring.  2.Echocardiogram.  3.CAD-asa,xarelto,b blocker,statin.  4.HTN-cozaar, b blocker.  5.Lipid d/o-statin,add vascepa as outpatient.  6.PPI.

## 2021-09-21 ENCOUNTER — EMERGENCY (EMERGENCY)
Facility: HOSPITAL | Age: 76
LOS: 1 days | Discharge: HOME | End: 2021-09-21
Attending: EMERGENCY MEDICINE
Payer: COMMERCIAL

## 2021-09-21 VITALS
DIASTOLIC BLOOD PRESSURE: 83 MMHG | HEART RATE: 55 BPM | SYSTOLIC BLOOD PRESSURE: 163 MMHG | OXYGEN SATURATION: 99 % | RESPIRATION RATE: 18 BRPM | WEIGHT: 143.96 LBS | HEIGHT: 63 IN | TEMPERATURE: 98 F

## 2021-09-21 VITALS
HEART RATE: 54 BPM | TEMPERATURE: 98 F | SYSTOLIC BLOOD PRESSURE: 161 MMHG | OXYGEN SATURATION: 98 % | RESPIRATION RATE: 18 BRPM | DIASTOLIC BLOOD PRESSURE: 79 MMHG

## 2021-09-21 DIAGNOSIS — Z95.9 PRESENCE OF CARDIAC AND VASCULAR IMPLANT AND GRAFT, UNSPECIFIED: Chronic | ICD-10-CM

## 2021-09-21 DIAGNOSIS — Z98.890 OTHER SPECIFIED POSTPROCEDURAL STATES: Chronic | ICD-10-CM

## 2021-09-21 PROBLEM — E78.5 HYPERLIPIDEMIA, UNSPECIFIED: Chronic | Status: ACTIVE | Noted: 2021-08-30

## 2021-09-21 LAB
ALBUMIN SERPL ELPH-MCNC: 3.5 G/DL — SIGNIFICANT CHANGE UP (ref 3.5–5)
ALP SERPL-CCNC: 77 U/L — SIGNIFICANT CHANGE UP (ref 40–120)
ALT FLD-CCNC: 21 U/L DA — SIGNIFICANT CHANGE UP (ref 10–60)
ANION GAP SERPL CALC-SCNC: 10 MMOL/L — SIGNIFICANT CHANGE UP (ref 5–17)
APPEARANCE UR: CLEAR — SIGNIFICANT CHANGE UP
AST SERPL-CCNC: 15 U/L — SIGNIFICANT CHANGE UP (ref 10–40)
BASOPHILS # BLD AUTO: 0.1 K/UL — SIGNIFICANT CHANGE UP (ref 0–0.2)
BASOPHILS NFR BLD AUTO: 1.2 % — SIGNIFICANT CHANGE UP (ref 0–2)
BILIRUB SERPL-MCNC: 0.4 MG/DL — SIGNIFICANT CHANGE UP (ref 0.2–1.2)
BILIRUB UR-MCNC: NEGATIVE — SIGNIFICANT CHANGE UP
BUN SERPL-MCNC: 23 MG/DL — HIGH (ref 7–18)
CALCIUM SERPL-MCNC: 9 MG/DL — SIGNIFICANT CHANGE UP (ref 8.4–10.5)
CHLORIDE SERPL-SCNC: 108 MMOL/L — SIGNIFICANT CHANGE UP (ref 96–108)
CO2 SERPL-SCNC: 23 MMOL/L — SIGNIFICANT CHANGE UP (ref 22–31)
COLOR SPEC: YELLOW — SIGNIFICANT CHANGE UP
CREAT SERPL-MCNC: 0.62 MG/DL — SIGNIFICANT CHANGE UP (ref 0.5–1.3)
D DIMER BLD IA.RAPID-MCNC: 189 NG/ML DDU — SIGNIFICANT CHANGE UP
DIFF PNL FLD: ABNORMAL
EOSINOPHIL # BLD AUTO: 0.23 K/UL — SIGNIFICANT CHANGE UP (ref 0–0.5)
EOSINOPHIL NFR BLD AUTO: 2.8 % — SIGNIFICANT CHANGE UP (ref 0–6)
GLUCOSE SERPL-MCNC: 96 MG/DL — SIGNIFICANT CHANGE UP (ref 70–99)
GLUCOSE UR QL: NEGATIVE — SIGNIFICANT CHANGE UP
HCT VFR BLD CALC: 35.4 % — SIGNIFICANT CHANGE UP (ref 34.5–45)
HGB BLD-MCNC: 11.8 G/DL — SIGNIFICANT CHANGE UP (ref 11.5–15.5)
IMM GRANULOCYTES NFR BLD AUTO: 0.4 % — SIGNIFICANT CHANGE UP (ref 0–1.5)
KETONES UR-MCNC: NEGATIVE — SIGNIFICANT CHANGE UP
LEUKOCYTE ESTERASE UR-ACNC: NEGATIVE — SIGNIFICANT CHANGE UP
LYMPHOCYTES # BLD AUTO: 4.28 K/UL — HIGH (ref 1–3.3)
LYMPHOCYTES # BLD AUTO: 51.8 % — HIGH (ref 13–44)
MAGNESIUM SERPL-MCNC: 2 MG/DL — SIGNIFICANT CHANGE UP (ref 1.6–2.6)
MCHC RBC-ENTMCNC: 30 PG — SIGNIFICANT CHANGE UP (ref 27–34)
MCHC RBC-ENTMCNC: 33.3 GM/DL — SIGNIFICANT CHANGE UP (ref 32–36)
MCV RBC AUTO: 90.1 FL — SIGNIFICANT CHANGE UP (ref 80–100)
MONOCYTES # BLD AUTO: 0.61 K/UL — SIGNIFICANT CHANGE UP (ref 0–0.9)
MONOCYTES NFR BLD AUTO: 7.4 % — SIGNIFICANT CHANGE UP (ref 2–14)
NEUTROPHILS # BLD AUTO: 3.02 K/UL — SIGNIFICANT CHANGE UP (ref 1.8–7.4)
NEUTROPHILS NFR BLD AUTO: 36.4 % — LOW (ref 43–77)
NITRITE UR-MCNC: NEGATIVE — SIGNIFICANT CHANGE UP
NRBC # BLD: 0 /100 WBCS — SIGNIFICANT CHANGE UP (ref 0–0)
NT-PROBNP SERPL-SCNC: 180 PG/ML — SIGNIFICANT CHANGE UP (ref 0–450)
PH UR: 6 — SIGNIFICANT CHANGE UP (ref 5–8)
PLATELET # BLD AUTO: 284 K/UL — SIGNIFICANT CHANGE UP (ref 150–400)
POTASSIUM SERPL-MCNC: 4 MMOL/L — SIGNIFICANT CHANGE UP (ref 3.5–5.3)
POTASSIUM SERPL-SCNC: 4 MMOL/L — SIGNIFICANT CHANGE UP (ref 3.5–5.3)
PROT SERPL-MCNC: 7.3 G/DL — SIGNIFICANT CHANGE UP (ref 6–8.3)
PROT UR-MCNC: NEGATIVE — SIGNIFICANT CHANGE UP
RBC # BLD: 3.93 M/UL — SIGNIFICANT CHANGE UP (ref 3.8–5.2)
RBC # FLD: 12.1 % — SIGNIFICANT CHANGE UP (ref 10.3–14.5)
SARS-COV-2 RNA SPEC QL NAA+PROBE: SIGNIFICANT CHANGE UP
SODIUM SERPL-SCNC: 141 MMOL/L — SIGNIFICANT CHANGE UP (ref 135–145)
SP GR SPEC: 1.01 — SIGNIFICANT CHANGE UP (ref 1.01–1.02)
TROPONIN I SERPL-MCNC: <0.015 NG/ML — SIGNIFICANT CHANGE UP (ref 0–0.04)
UROBILINOGEN FLD QL: NEGATIVE — SIGNIFICANT CHANGE UP
WBC # BLD: 8.27 K/UL — SIGNIFICANT CHANGE UP (ref 3.8–10.5)
WBC # FLD AUTO: 8.27 K/UL — SIGNIFICANT CHANGE UP (ref 3.8–10.5)

## 2021-09-21 PROCEDURE — 36415 COLL VENOUS BLD VENIPUNCTURE: CPT

## 2021-09-21 PROCEDURE — 99284 EMERGENCY DEPT VISIT MOD MDM: CPT | Mod: 25

## 2021-09-21 PROCEDURE — 70450 CT HEAD/BRAIN W/O DYE: CPT | Mod: MA

## 2021-09-21 PROCEDURE — 99285 EMERGENCY DEPT VISIT HI MDM: CPT | Mod: CS

## 2021-09-21 PROCEDURE — 80053 COMPREHEN METABOLIC PANEL: CPT

## 2021-09-21 PROCEDURE — 83735 ASSAY OF MAGNESIUM: CPT

## 2021-09-21 PROCEDURE — 71045 X-RAY EXAM CHEST 1 VIEW: CPT

## 2021-09-21 PROCEDURE — 84484 ASSAY OF TROPONIN QUANT: CPT

## 2021-09-21 PROCEDURE — 81001 URINALYSIS AUTO W/SCOPE: CPT

## 2021-09-21 PROCEDURE — 83880 ASSAY OF NATRIURETIC PEPTIDE: CPT

## 2021-09-21 PROCEDURE — 93005 ELECTROCARDIOGRAM TRACING: CPT

## 2021-09-21 PROCEDURE — 70450 CT HEAD/BRAIN W/O DYE: CPT | Mod: 26,MA

## 2021-09-21 PROCEDURE — 87635 SARS-COV-2 COVID-19 AMP PRB: CPT

## 2021-09-21 PROCEDURE — 85379 FIBRIN DEGRADATION QUANT: CPT

## 2021-09-21 PROCEDURE — 85025 COMPLETE CBC W/AUTO DIFF WBC: CPT

## 2021-09-21 PROCEDURE — 71045 X-RAY EXAM CHEST 1 VIEW: CPT | Mod: 26

## 2021-09-21 PROCEDURE — 93010 ELECTROCARDIOGRAM REPORT: CPT

## 2021-09-21 PROCEDURE — 87086 URINE CULTURE/COLONY COUNT: CPT

## 2021-09-21 NOTE — ED PROVIDER NOTE - CLINICAL SUMMARY MEDICAL DECISION MAKING FREE TEXT BOX
77yo F with hx CAD s/p stent, HTN, HLD, recent TIA presents with shortness of breath. Will obtain ekg, labs, CXR, CT head. Exam shows right sided weakness/numbness. Not tPA candidate in setting of symptoms onset 2 days ago.  ekg shows sinus bradycardia, HR 50s on telemonitor  patient signedout to Dr. García at 730am.

## 2021-09-21 NOTE — ED ADULT NURSE NOTE - NSIMPLEMENTINTERV_GEN_ALL_ED
Implemented All Fall with Harm Risk Interventions:  [place] to call system. Call bell, personal items and telephone within reach. Instruct patient to call for assistance. Room bathroom lighting operational. Non-slip footwear when patient is off stretcher. Physically safe environment: no spills, clutter or unnecessary equipment. Stretcher in lowest position, wheels locked, appropriate side rails in place. Provide visual cue, wrist band, yellow gown, etc. Monitor gait and stability. Monitor for mental status changes and reorient to person, place, and time. Review medications for side effects contributing to fall risk. Reinforce activity limits and safety measures with patient and family. Provide visual clues: red socks.

## 2021-09-21 NOTE — ED PROVIDER NOTE - NSFOLLOWUPINSTRUCTIONS_ED_ALL_ED_FT
Log Out.      [employer]® CareNotes®     :  [facility]  	                       DYSPNEA - AfterCare(R) Instructions(ER/ED)           Disnea    LO QUE NECESITA SABER:    La disnea es tyshawn dificultad o incomodidad al respirar. Es posible que tenga respiración fatigosa, dolorosa o poco profunda. Es posible que le falte el aire. La disnea puede ocurrir mientras está en reposo o al realizar tyshawn actividad. Es posible que tenga disnea por un corto período de tiempo, o puede ser crónica. La disnea es a menudo un síntoma de tyshawn enfermedad o afección.    INSTRUCCIONES SOBRE EL BRENDA HOSPITALARIA:    Regrese a la christiana de emergencias si:  •Rolanda signos y síntomas están igual o empeoran en las siguientes 24 horas del tratamiento.      •Usted tiene escalofríos con temblores o fiebre de más de 102° F (38.9° C).      •Usted tiene un nuevo dolor, presión u opresión en mehta pecho.      •Usted tiene tyshawn nueva o empeora mehta tos o sibilancias (respiración ruidosa en el pecho) o expectora boubacar      •Usted siente que no recibe suficiente aire.      •La piel sobre rolanda costillas o en mehta phuc se hunden cuando usted respira.      •Usted tiene un amado dolor de alicia con vómitos y dolor abdominal.      •Usted se siente confundido o mareado      Llame a mehta médico o especialista si:  •Usted tiene preguntas o inquietudes acerca de mehta condición o cuidado.          Medicamentos:  •Los medicamentospueden administrarse para tratar la causa de mehta disnea. Los medicamentos pueden reducir la inflamación de las vías respiratorias o disminuir el líquido alrededor del corazón o los pulmones. Se pueden administrar otros medicamentos para reducir la ansiedad y para que se sienta más calmado y relajado.      •Cassel ro[place] medicamentos laurie se le haya indicado.Consulte con mehta médico si usted ishan que mehta medicamento no le está ayudando o si presenta efectos secundarios. Infórmele si es alérgico a cualquier medicamento. Mantenga tyshawn lista actualizada de los medicamentos, las vitaminas y los productos herbales que jose. Incluya los siguientes datos de los medicamentos: cantidad, frecuencia y motivo de administración. Traiga con usted la lista o los envases de las píldoras a rolanda citas de seguimiento. Lleve la lista de los medicamentos con usted en bay de tyshawn emergencia.      Controle la disnea de larga duración:  •El[place]ore un plan de acción.Usted y mehta médico pueden trabajar juntos para crear un plan sobre cómo manejar los episodios de disnea. El plan puede incluir las actividades diarias, cambios de tratamiento y qué hacer si usted tiene problemas respiratorios graves.      •Al shobha asiento inclínese hacia adelante en [place] codos.[place] ayuda a expandir los pulmones y puede que le sea más fácil respirar.      •Use la respiración con los labios fruncidos cada vez que se sienta corto de respiración.Respire por la nariz y muy despacio exhale por mehta boca con los labios ligeramente fruncidos o en forma de ''U''. Se debería demorar el doble de tiempo al expulsar el aire de lo que le luis carlos en inhalarlo.  Inhalar y exhalar           •No fume.La nicotina y otros químicos contenidos en los cigarrillos y puros pueden causar daño pulmonar y empeorar rolanda síntomas. Pida información a mehta médico si usted actualmente fuma y necesita ayuda para dejar de fumar. Los cigarrillos electrónicos o el tabaco sin humo igualmente contienen nicotina. Consulte con mehta médico antes de utilizar estos productos.      •Alcance o mantenga un peso saludable.Mehta médico le puede ayudar a elaborar un plan para perder peso de tyshawn forma gan si usted tiene sobrepeso.      •Ejercítese según indicaciones.El ejercicio puede ayudar a los pulmones a trabajar más fácilmente. El ejercicio también puede ayudar a perder peso, si es necesario. Trate de hacer unos 30 minutos de ejercicio la mayoría de los días de la semana. Mehta médico puede ayudarlo a crear un plan de ejercicios que sea seguro para usted.      Acuda a ro[place] consultas de control con mehta médico o especialista según le indicaron:Anote ro[place]a preguntas para que se acuerde de hacerlas mercy rolanda visitas.       © Copyright [employer] 2021           back to top                          © Copyright [employer] 2021

## 2021-09-21 NOTE — ED PROVIDER NOTE - OBJECTIVE STATEMENT
77yo F with hx CAD s/p stent, HTN, HLD, recent TIA presents with shortness of breath. Patient reports that for the last 2 days she has not been feeling well, reports feeling numbness sensation over right side of body and feeling and emptiness at right side of head. Reports that today at 3am she woke up feeling shortness of breath, dizziness and generalized weakness. Reports nausea but no vomiting. Denies fever, cough, vomiting or diarrhea. Denies urinary symptoms. daughter at bedside reports patient was admitted to [facility] for TIA a few weeks ago, yesterday followed up with Dr. Ortega who made changes to blood pressure medication which patient has not started yet.

## 2021-09-21 NOTE — ED PROVIDER NOTE - PROGRESS NOTE DETAILS
García:  Pt received in signout from Dr. Herring, f/u CT head--CT shows no acute pathology.  Pt says she never had any numbness today and no other stroke symptoms.  She is now asymptomatic.  I spoke with PMD Dr. Butterfield and he then came to see Pt in ED and says that she may be safely discharged, f/u with him and Dr. Ortega outpatient prn.  Pt with her daughter.  I explained to them and Pt wants to go home.

## 2021-09-21 NOTE — ED PROVIDER NOTE - PATIENT PORTAL LINK FT
You can access the FollowMyHealth Patient Portal offered by [facility] by registering at the following website: [email or web address]. By joining [employer]’s FollowMyHealth portal, you will also be able to view your health information using other applications (apps) compatible with our system.

## 2021-09-22 LAB
CULTURE RESULTS: SIGNIFICANT CHANGE UP
SPECIMEN SOURCE: SIGNIFICANT CHANGE UP

## 2022-03-08 ENCOUNTER — OUTPATIENT (OUTPATIENT)
Dept: OUTPATIENT SERVICES | Facility: HOSPITAL | Age: 77
LOS: 1 days | End: 2022-03-08
Payer: COMMERCIAL

## 2022-03-08 DIAGNOSIS — Z95.9 PRESENCE OF CARDIAC AND VASCULAR IMPLANT AND GRAFT, UNSPECIFIED: Chronic | ICD-10-CM

## 2022-03-08 DIAGNOSIS — R07.89 OTHER CHEST PAIN: ICD-10-CM

## 2022-03-08 DIAGNOSIS — Z98.890 OTHER SPECIFIED POSTPROCEDURAL STATES: Chronic | ICD-10-CM

## 2022-03-08 DIAGNOSIS — R06.02 SHORTNESS OF BREATH: ICD-10-CM

## 2022-03-08 PROCEDURE — 93017 CV STRESS TEST TRACING ONLY: CPT

## 2022-03-08 PROCEDURE — 78452 HT MUSCLE IMAGE SPECT MULT: CPT | Mod: MH

## 2022-03-08 PROCEDURE — A9502: CPT

## 2022-05-10 PROBLEM — Z00.00 ENCOUNTER FOR PREVENTIVE HEALTH EXAMINATION: Status: ACTIVE | Noted: 2022-05-10

## 2022-05-12 ENCOUNTER — APPOINTMENT (OUTPATIENT)
Dept: OTOLARYNGOLOGY | Facility: CLINIC | Age: 77
End: 2022-05-12

## 2022-06-16 NOTE — ED ADULT NURSE NOTE - NS ED NOTE ABUSE RESPONSE YN
What Type Of Note Output Would You Prefer (Optional)?: Bullet Format How Severe Is Your Skin Lesion?: mild Has Your Skin Lesion Been Treated?: not been treated Is This A New Presentation, Or A Follow-Up?: Mole Yes

## 2023-01-24 ENCOUNTER — APPOINTMENT (OUTPATIENT)
Dept: SURGERY | Facility: CLINIC | Age: 78
End: 2023-01-24
Payer: MEDICARE

## 2023-01-24 PROCEDURE — 99203 OFFICE O/P NEW LOW 30 MIN: CPT

## 2023-02-06 NOTE — H&P ADULT - PROBLEM SELECTOR PROBLEM 5
Mago Shepherd  79 y.o. female  1941  FKZ:[id number]  [facility]  Progress Note     Encounter Date: 2/6/2023      Mago Shepherd is a 79 y.o. female who was seen by synchronous (real-time) audio-video technology on 2/6/2023. Consent: Mago Shepherd, who was seen by synchronous (real-time) audio-video technology, and/or her healthcare decision maker, is aware that this patient-initiated, Telehealth encounter on 2/6/2023 is a billable service, with coverage as determined by her insurance carrier. She is aware that she may receive a bill and has provided verbal consent to proceed: Yes. Assessment & Plan:   1. Facial neuralgia  Refilled hydrocodone   checked and is OK  Taking higher dose of gabapentin after melanoma surgery to right face  - HYDROcodone-acetaminophen (NORCO) 5-325 mg per tablet; Take 1 Tablet by mouth every eight (8) hours as needed for Pain for up to 30 days. Max Daily Amount: 3 Tablets. Dispense: 45 Tablet; Refill: 0  - HYDROcodone-acetaminophen (NORCO) 5-325 mg per tablet; Take 1 Tablet by mouth every eight (8) hours as needed for Pain for up to 30 days. Max Daily Amount: 3 Tablets. Dispense: 45 Tablet; Refill: 0  - HYDROcodone-acetaminophen (NORCO) 5-325 mg per tablet; Take 1 Tablet by mouth every eight (8) hours as needed for Pain for up to 30 days. Max Daily Amount: 3 Tablets. Dispense: 45 Tablet; Refill: 0    2. Primary hypertension  BP close to goal on meds  Stay on lisinopril 10 mg BID and amlodipine 2.5 mg qhs  FU 3-4 weeks for BP check  - amLODIPine (NORVASC) 2.5 mg tablet; Take 1 Tablet by mouth nightly. Dispense: 90 Tablet; Refill: 1    3. Malignant melanoma of face excluding eyelid, nose, lip, and ear (HCC)  S/P surgery by Dr. Imelda Andersen and Dispositions    Return in about 1 month (around 3/6/2023) for Blood pressure follow up.          I spent at least 23 minutes on this visit with this established patient. ([id number])    Subjective: Jc Dominguez is a 79 y.o. female who was seen for Medication Refill, Labs (Labs results ), and Blood Pressure Check    BP  Was up when she saw Dr. Dinesh Phillips  Patient resumed amlodipine 2.5 mg on her own and went back to lisinopril BID  BP's now better at home ranging from 618-004 systolic in the AM and 241-757 systolic in the evening    Pain  Had a lot of pain with melanoma surgery by Dr. Blake Valentin so took extra gabapentin. 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  looks OK and does not reflect any change in how her routine facial pain is being managed. Tried to take naprosyn post op but it tore up her stomach. Prior to Admission medications    Medication Sig Start Date End Date Taking? Authorizing Provider   amLODIPine (NORVASC) 2.5 mg tablet Take 1 Tablet by mouth nightly. 2/6/23  Yes Devi Elias MD   HYDROcodone-acetaminophen [facility]) 5-325 mg per tablet Take 1 Tablet by mouth every eight (8) hours as needed for Pain for up to 30 days. Max Daily Amount: 3 Tablets. 2/10/23 3/12/23 Yes Devi Elias MD   HYDROcodone-acetaminophen [facility]) 5-325 mg per tablet Take 1 Tablet by mouth every eight (8) hours as needed for Pain for up to 30 days. Max Daily Amount: 3 Tablets. 3/10/23 4/9/23 Yes Devi Elias MD   HYDROcodone-acetaminophen [facility]) 5-325 mg per tablet Take 1 Tablet by mouth every eight (8) hours as needed for Pain for up to 30 days. Max Daily Amount: 3 Tablets. 4/10/23 5/10/23 Yes Devi Elias MD   omeprazole (PRILOSEC) 20 mg capsule TAKE 1 CAPSULE EVERY DAY 11/18/22   Chaitanya Iglesias MD   fluticasone propionate [facility]) 50 mcg/actuation nasal spray USE 1 SPRAY IN EACH NOSTRIL EVERY DAY 11/18/22   Chaitanya Iglesias MD   HYDROcodone-acetaminophen [facility]) 5-325 mg per tablet Take 1 Tablet by mouth every eight (8) hours as needed for Pain for up to 30 days. Max Daily Amount: 3 Tablets. 1/10/23 2/6/23  Yulisa Guzmán MD   diphenoxylate-atropine (LOMOTIL) 2.5-0.025 mg per tablet TAKE 1 TABLET FOUR TIMES DAILY AS NEEDED FOR DIARRHEA (MAX DAILY AMOUNT: 4 TABLETS) 8/11/22   Yulisa Guzmán MD   lisinopriL (PRINIVIL, ZESTRIL) 10 mg tablet Take 1 Tablet by mouth two (2) times a day. 8/10/22   Yulisa Guzmán MD   gabapentin (NEURONTIN) 100 mg capsule One by mouth in AM and two by mouth at bedtime. 8/10/22   Yulisa Guzmán MD   meloxicam (MOBIC) 15 mg tablet TAKE 1 TABLET EVERY DAY 8/10/22   Yulisa Guzmán MD   pramipexole (MIRAPEX) 0.125 mg tablet TAKE 2 TABLETS EVERY NIGHT 8/8/22   Yulisa Guzmán MD   nystatin (MYCOSTATIN) 100,000 unit/mL suspension Take 5 mL by mouth four (4) times daily. swish and spit 1/17/22   Yulisa Guzmán MD   benzocaine 20 % liqd Instill 4 to 5 drops of otic solution into external ear canal of affected ear(s), repeat every 1 to 2 hours if necessary 2/20/19   Xochilt Taylor MD     Allergies   Allergen Reactions    Alendronate Unknown (comments)    Doxycycline Unknown (comments)    Erythromycin Unknown (comments)    Nsaids (Non-Steroidal Anti-Inflammatory Drug) Unknown (comments)    Pcn [Penicillins] Unknown (comments)           Review of Systems   Respiratory:  Negative for shortness of breath. Cardiovascular:  Negative for chest pain. Gastrointestinal:  Positive for abdominal pain and diarrhea. Negative for blood in stool, nausea and vomiting. Psychiatric/Behavioral: Negative. Objective:   Vital Signs: (As obtained by patient/caregiver at home)  There were no vitals taken for this visit.      [INSTRUCTIONS:  \"[x]\" Indicates a positive item  \"[]\" Indicates a negative item  -- DELETE ALL ITEMS NOT EXAMINED]    Constitutional: [x] Appears well-developed and well-nourished [x] No apparent distress      [] Abnormal -     Mental status: [x] Alert and awake  [x] Oriented to person/place/time [x] Able to follow commands    [] Abnormal -     Eyes:   EOM    [x]  Normal    [] Abnormal -   Sclera  [x]  Normal    [] Abnormal -          Discharge [x]  None visible   [] Abnormal -     HENT: [x] Normocephalic, atraumatic  [] Abnormal -   [x] Mouth/Throat: Mucous membranes are moist    External Ears [x] Normal  [] Abnormal -    Neck: [x] No visualized mass [] Abnormal -     Pulmonary/Chest: [x] Respiratory effort normal   [x] No visualized signs of difficulty breathing or respiratory distress        [] Abnormal -      Musculoskeletal:   [x] Normal gait with no signs of ataxia         [x] Normal range of motion of neck        [] Abnormal -     Neurological:        [x] No Facial Asymmetry (Cranial nerve 7 motor function) (limited exam due to video visit)          [x] No gaze palsy        [] Abnormal -          Skin:        [x] No significant exanthematous lesions or discoloration noted on facial skin         [] Abnormal -            Psychiatric:       [x] Normal Affect [] Abnormal -        [x] No Hallucinations    Other pertinent observable physical exam findings:-        We discussed the expected course, resolution and complications of the diagnosis(es) in detail. Medication risks, benefits, costs, interactions, and alternatives were discussed as indicated. I advised her to contact the office if her condition worsens, changes or fails to improve as anticipated. She expressed understanding with the diagnosis(es) and plan. Alex Park is a 79 y.o. female who was evaluated by a video visit encounter for concerns as above. Patient identification was verified prior to start of the visit. A caregiver was present when appropriate. Due to this being a TeleHealth encounter (During CEBKX-29 public health emergency), evaluation of the following organ systems was limited: Vitals/Constitutional/EENT/Resp/CV/GI//MS/Neuro/Skin/Heme-Lymph-Imm.   Pursuant to the emergency declaration under the [address] Emergencies Act, 1135 waiver authority and the Coronavirus Preparedness and Response Supplemental Appropriations Act, this Virtual  Visit was conducted, with patient's (and/or legal guardian's) consent, to reduce the patient's risk of exposure to COVID-19 and provide necessary medical care. Services were provided through a video synchronous discussion virtually to substitute for in-person clinic visit. Patient was at home and I was in office for this video visit. Javier Wheatley.  Beto Baez MD Prophylactic measure

## 2023-02-15 NOTE — ED ADULT NURSE NOTE - NSFALLRSKASSESSDT_ED_ALL_ED
25-May-2019 09:30
Dupixent Monitoring Guidelines: There is no laboratory monitoring requirement with Dupixent.
Is Cyclosporine Contraindicated?: No
Diagnosis (Required): Atopic Dermatitis/Eczematous Dermatitis
Pregnancy And Lactation Warning Text: There have not been adverse fetal risks in women taking Dupixent while pregnant. It is unknown if this medication is excreted in breast milk.
Dupixent Dosing: Use Override Dosage
Detail Level: Zone
Dupixent Dosing Override: 200mg/1.14ml SQ syringe Q 4 weeks

## 2023-03-13 ENCOUNTER — APPOINTMENT (OUTPATIENT)
Dept: SURGERY | Facility: CLINIC | Age: 78
End: 2023-03-13
Payer: MEDICARE

## 2023-03-13 VITALS
HEIGHT: 62 IN | HEART RATE: 79 BPM | BODY MASS INDEX: 26.13 KG/M2 | SYSTOLIC BLOOD PRESSURE: 120 MMHG | DIASTOLIC BLOOD PRESSURE: 71 MMHG | WEIGHT: 142 LBS

## 2023-03-13 DIAGNOSIS — Z80.1 FAMILY HISTORY OF MALIGNANT NEOPLASM OF TRACHEA, BRONCHUS AND LUNG: ICD-10-CM

## 2023-03-13 DIAGNOSIS — Z86.79 PERSONAL HISTORY OF OTHER DISEASES OF THE CIRCULATORY SYSTEM: ICD-10-CM

## 2023-03-13 DIAGNOSIS — Z63.5 DISRUPTION OF FAMILY BY SEPARATION AND DIVORCE: ICD-10-CM

## 2023-03-13 DIAGNOSIS — Z86.39 PERSONAL HISTORY OF OTHER ENDOCRINE, NUTRITIONAL AND METABOLIC DISEASE: ICD-10-CM

## 2023-03-13 DIAGNOSIS — Z82.49 FAMILY HISTORY OF ISCHEMIC HEART DISEASE AND OTHER DISEASES OF THE CIRCULATORY SYSTEM: ICD-10-CM

## 2023-03-13 DIAGNOSIS — Z87.891 PERSONAL HISTORY OF NICOTINE DEPENDENCE: ICD-10-CM

## 2023-03-13 PROCEDURE — 99213 OFFICE O/P EST LOW 20 MIN: CPT

## 2023-03-13 RX ORDER — MELOXICAM 15 MG/1
15 TABLET ORAL
Qty: 30 | Refills: 0 | Status: ACTIVE | COMMUNITY
Start: 2022-09-23

## 2023-03-13 RX ORDER — LOSARTAN POTASSIUM 100 MG/1
100 TABLET, FILM COATED ORAL
Qty: 90 | Refills: 0 | Status: ACTIVE | COMMUNITY
Start: 2022-11-13

## 2023-03-13 RX ORDER — NIFEDIPINE 60 MG/1
60 TABLET, FILM COATED, EXTENDED RELEASE ORAL
Qty: 90 | Refills: 0 | Status: ACTIVE | COMMUNITY
Start: 2023-02-15

## 2023-03-13 RX ORDER — METOPROLOL SUCCINATE 25 MG/1
25 TABLET, EXTENDED RELEASE ORAL
Qty: 90 | Refills: 0 | Status: ACTIVE | COMMUNITY
Start: 2022-12-16

## 2023-03-13 RX ORDER — ASPIRIN 325 MG/1
TABLET, FILM COATED ORAL
Refills: 0 | Status: ACTIVE | COMMUNITY

## 2023-03-13 RX ORDER — HALOBETASOL PROPIONATE 0.5 MG/G
0.05 OINTMENT TOPICAL
Qty: 100 | Refills: 0 | Status: ACTIVE | COMMUNITY
Start: 2022-10-24

## 2023-03-13 RX ORDER — ATORVASTATIN CALCIUM 80 MG/1
80 TABLET, FILM COATED ORAL
Qty: 90 | Refills: 0 | Status: ACTIVE | COMMUNITY
Start: 2022-02-02

## 2023-03-13 RX ORDER — FUROSEMIDE 20 MG/1
20 TABLET ORAL
Qty: 90 | Refills: 0 | Status: ACTIVE | COMMUNITY
Start: 2022-04-12

## 2023-03-13 RX ORDER — KETOCONAZOLE 20 MG/G
2 CREAM TOPICAL
Qty: 120 | Refills: 0 | Status: ACTIVE | COMMUNITY
Start: 2022-10-24

## 2023-03-13 RX ORDER — ATORVASTATIN CALCIUM 20 MG/1
20 TABLET, FILM COATED ORAL
Qty: 90 | Refills: 0 | Status: ACTIVE | COMMUNITY
Start: 2022-04-08

## 2023-03-13 RX ORDER — DULOXETINE HYDROCHLORIDE 20 MG/1
20 CAPSULE, DELAYED RELEASE PELLETS ORAL
Qty: 30 | Refills: 0 | Status: ACTIVE | COMMUNITY
Start: 2022-11-20

## 2023-03-13 RX ORDER — PAROXETINE HYDROCHLORIDE 10 MG/1
10 TABLET, FILM COATED ORAL
Qty: 90 | Refills: 0 | Status: ACTIVE | COMMUNITY
Start: 2023-02-24

## 2023-03-13 RX ORDER — DICLOFENAC SODIUM 20 MG/G
2 SOLUTION TOPICAL
Qty: 112 | Refills: 0 | Status: ACTIVE | COMMUNITY
Start: 2022-09-23

## 2023-03-13 RX ORDER — ICOSAPENT ETHYL 1000 MG/1
1 CAPSULE ORAL
Qty: 360 | Refills: 0 | Status: ACTIVE | COMMUNITY
Start: 2022-10-01

## 2023-03-13 RX ORDER — SERTRALINE 25 MG/1
25 TABLET, FILM COATED ORAL
Qty: 30 | Refills: 0 | Status: ACTIVE | COMMUNITY
Start: 2022-09-16

## 2023-03-13 SDOH — SOCIAL STABILITY - SOCIAL INSECURITY: DISRUPTION OF FAMILY BY SEPARATION AND DIVORCE: Z63.5

## 2023-03-13 NOTE — PLAN
[FreeTextEntry1] : Ms. JESSE BOB  was told significance of findings, options, risks and benefits were explained.  All surgical options were discussed including non-surgical treatments.    she was advised to have an MRI and follow up after the test. \par Patient advised to seek immediate medical attention with any acute change in symptoms or with the development of any new or worsening symptoms.  Patient's questions and concerns addressed to patient's satisfaction, and patient verbalized an understanding of the information discussed.\par \par

## 2023-03-13 NOTE — HISTORY OF PRESENT ILLNESS
[de-identified] : This is  a 78 year   old patient presenting today to discuss  her surgery. Patient had a Left breast US and   Left breast Mammogram on 12/15/2022  Deemed BIRADS category 4. Ms. JESSE BOB  is s/p US guided  left breast biopsy on 01/12/2023. 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 Invasive tumor measures at least 0.3 cm. 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 Microcalcifications and lymphovascular permeation by tumor are not seen.  The pathology results are concordant with the imaging.   she was advised to have MRI BL breasts.  she states that she went for an MRI and she developed hand  swelling after she was injected with contrast material so the procedure was aborted.

## 2023-03-13 NOTE — DATA REVIEWED
[FreeTextEntry1] : 	\par Exam requested by:\par JAYLEN PIERSON MD\par [address]\par [facility] [id number]\par SITE PERFORMED: FLUSHING\par SITE PHONE: [phone]\par Patient: RAHUL POLLARD\par YOB: 1945\par Phone: [phone]\par MRN: [id number] Acc: [id number]\par Date of Exam: 01-\par  \par Addendum 2 - 01-\par  \par Receipt and review of these results by Dr. Pickens was confirmed by the critical results team on 1/18/2023 at 8:49 AM.\par \par \par Thank you for the opportunity to participate in the care of this patient.  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 \par \par Follow-up recommendations: Surgical/oncologic consultation for definitive treatment assessment.\par \par Breast MRI with and without contrast to assess extent of disease may be considered as part of the patient's treatment planning.\par \par If the ER/MS/HER-2 study results are desired, they can be obtained directly from the [facility], [phone],  which performed the microscopic analysis.\par \par \par \par Results were forwarded to the ordering physician by the department staff.\par \par Thank you for the opportunity to participate in the care of this patient.  \par  \par Ovidio Mariee MD  - Electronically Signed: 01- 5:20 PM \par Physician to Physician Direct Line is: [phone]\par Original Report\par EXAM: ULTRASOUND-GUIDED CORE BIOPSY 1 SITE WITH VACUUM\par \par HISTORY: The patient is 77 years old and is referred for an ultrasound-guided needle biopsy:\par Left 2:00 N 10 cm, adjacent 4 and 8 mm nodules.\par \par COMPARISON:  Prior breast imaging studies dated December 2022 \par \par PROCEDURE: The risks and benefits of the procedure were conveyed to the patient, using a  if necessary, and the patient consented to the procedure. Universal timeout was performed. Targeted ultrasound performed prior to the procedure reconfirms the suspicious lesion. \par \par Using sterile technique, 7 mL of 1% lidocaine was administered. Under sonographic visualization and utilizing an introducer, a 12-gauge vacuum-assisted spring-loaded core biopsy device was used to sample the target. Multiple samples were obtained. A cylinder shaped biopsy marker was deployed at the biopsy site. A sonographically visualized residual lesion was not present after sampling. \par \par A postprocedure mammogram demonstrates appropriate placement of the marker.\par \par The patient tolerated the procedure well without complications. The patient was given postbiopsy care instructions. The specimen was subsequently sent to the pathology lab. \par \par IMPRESSION: 1 site ultrasound-guided core biopsy was performed. \par \par Pathology results and concordance will follow as an addendum to this report. \par \par Thank you for the opportunity to participate in the care of this patient.  \par  \par Ovidio Mariee MD  - Electronically Signed: 01- 5:02 PM \par Physician to Physician Direct Line is: [phone]\par

## 2023-03-13 NOTE — PHYSICAL EXAM
[Alert] : alert [Oriented to Person] : oriented to person [Oriented to Place] : oriented to place [Oriented to Time] : oriented to time [Calm] : calm [de-identified] : She  is alert, well-groomed, and in NAD\par   [de-identified] : anicteric.  Nasal mucosa pink, septum midline. Oral mucosa pink.  Tongue midline, Pharynx without exudates.\par   [de-identified] : Neck supple. Trachea midline. Thyroid isthmus barely palpable, lobes not felt.\par   [de-identified] : No chest deformity. Breast are symmetric, Normal contours. No nodules, masses, tenderness, or axillary or supraclavicular  adenopathy. No nipple discharge. no skin retraction

## 2023-03-25 NOTE — ED PROVIDER NOTE - NS ED NOTE AC HIGH RISK COUNTRIES
2x .5cm abrasion with break in the skin and larger linear superficial  abrasions with no break in the skin.    ortho- fine No

## 2023-04-03 ENCOUNTER — APPOINTMENT (OUTPATIENT)
Dept: SURGERY | Facility: CLINIC | Age: 78
End: 2023-04-03
Payer: MEDICARE

## 2023-04-03 VITALS
WEIGHT: 137 LBS | DIASTOLIC BLOOD PRESSURE: 66 MMHG | HEART RATE: 66 BPM | BODY MASS INDEX: 25.53 KG/M2 | SYSTOLIC BLOOD PRESSURE: 154 MMHG | HEIGHT: 61.5 IN

## 2023-04-03 DIAGNOSIS — N63.10 UNSPECIFIED LUMP IN THE RIGHT BREAST, UNSPECIFIED QUADRANT: ICD-10-CM

## 2023-04-03 PROCEDURE — 99213 OFFICE O/P EST LOW 20 MIN: CPT

## 2023-04-03 NOTE — PHYSICAL EXAM
[Alert] : alert [Oriented to Person] : oriented to person [Oriented to Place] : oriented to place [Oriented to Time] : oriented to time [Calm] : calm [de-identified] : She  is alert, well-groomed, and in NAD\par   [de-identified] : anicteric.  Nasal mucosa pink, septum midline. Oral mucosa pink.  Tongue midline, Pharynx without exudates.\par   [de-identified] : Neck supple. Trachea midline. Thyroid isthmus barely palpable, lobes not felt.\par   [de-identified] : No chest deformity. Breast are symmetric, Normal contours. No nodules, masses, tenderness, or axillary or supraclavicular  adenopathy. No nipple discharge. no skin retraction

## 2023-04-03 NOTE — DATA REVIEWED
[FreeTextEntry1] : 	\par Exam requested by:\par FABIOLA WOODSON MD\par [address]\par [facility] [address]\par SITE PERFORMED: FLUSHING\par SITE PHONE: [phone]\par Patient: RAHUL POLLARD\par YOB: 1945\par Phone: [phone]\par MRN: [id number] Acc: [id number]\par Date of Exam: 03-\par  \par EXAM: MRI BILATERAL BREASTS WITHOUT AND WITH CONTRAST\par \par HISTORY: The patient is 78 years old and is seen for newly diagnosed left breast invasive ductal cancer. Extent of disease evaluation.\par \par TECHNIQUE: Breast MR protocol - Multiplanar, multisequence MR imaging of both breasts was performed on a 3T magnet: T2 weighted sequence with fat suppression in the axial plane, T1 weighted sequences with fat suppression before and after the administration of gadolinium contrast in the axial plane, and sagittal reformats. \par \par IV Contrast:  14 ml of Clariscan was injected from a 15 ml single use vial.\par \par The patient was scanned in the prone position utilizing a dedicated breast coil.  \par \par Post processing subtraction was performed. This study was analyzed on a [employer] Workstation with capabilities of multiplanar reformatting, maximum intensity projection, computer aided detection and time:signal intensity kinetic curve generation.  Results were compared with the PACS workstation. \par \par COMPARISON: Mammography and/or breast ultrasound dating back to 2018. Last left breast standard mammography is from December 2022. Last right breast mammography in our system is from February 2022. January 12, 2023 ultrasound core biopsy left breast 2 o'clock 10 cm from nipple, invasive ductal carcinoma with papillary and mucinous features and papillary carcinoma in situ.\par \par FINDINGS:  \par \par BACKGROUND BREAST APPEARANCE: There is heterogeneously dense fibroglandular tissue with mild background parenchymal enhancement.\par \par Unremarkable nipple bilaterally. Unremarkable skin bilaterally.\par \par Right breast middle depth 9 o'clock biopsy clip. Right breast anterior depth through middle/posterior depth shows multiple scattered subcentimeter type I nonmass enhancing foci. Most concerning area in the right breast is 10 o'clock middle depth, image image 90 series 604 and image 229 series 611, clumped nonmass enhancement with maximal dimension of 1 cm, indeterminate.\par \par Left breast middle depth 1 o'clock to 2 o'clock, biopsy clip adjacent to confluent type I and type II clumped nonmass enhancement consistent with malignancy, measuring 2 x 1.4 cm, biopsy clip is at the anterior margin of this abnormal enhancement, consistent with biopsy-proven malignancy. Segmental distribution confluent clumped, linear and linear branching type I and type II nonmass enhancement is scattered through left breast anterior depth through middle/posterior depth 1 o'clock through 5 o'clock, consistent with a minimum of in situ carcinoma.\par \par There is no evidence of axillary or internal mammary lymphadenopathy.\par \par IMPRESSION: \par \par 1.  Indeterminate enhancement pattern in the right breast. Most concerning area of enhancement is at middle depth 10 o'clock. MRI guided biopsy right breast x1 is recommended. Prior to biopsy, diagnostic right mammography and complete right breast ultrasound are recommended to determine if any additional biopsy targets can be identified.\par \par 2.  Biopsy-proven malignancy in the left breast. Left breast 1 o'clock to 5 o'clock shows evidence of significant residual malignancy.\par \par FOLLOW-UP: MR guided biopsy.\par                           Prebiopsy diagnostic right mammography and right breast ultrasound. \par \par I have activated the R critical results pathway to confirm that the ordering provider receives these results. \par \par Relevant key image markings can be viewed through our online portal or on burned discs.\par \par \par ASSESSMENT: BI-RADS Category 4:  Suspicious.\par \par Thank you for the opportunity to participate in the care of this patient.  \par  \par Jed Hutchins MD  - Electronically Signed: 03- 4:03 PM \par Physician to Physician Direct Line is: [phone]\par

## 2023-04-03 NOTE — HISTORY OF PRESENT ILLNESS
[de-identified] : This is  a 78 year   old patient presenting today  to discuss the results of her recent  BL breast MRI and her surgical options.    BL breast MRI on 03/21/2023  was deemed BIRADS 4 and showed    Indeterminate enhancement pattern in the right breast. Most concerning area of enhancement is at middle depth 10 o'clock. MRI guided biopsy right breast x1 is recommended.   Ms. JESSE BOB denies any trauma and she has no nipple discharge. Patient denies any fever, night sweats or loss of appetite.\par \par PERTINENT HISTORY: \par Patient had a Left breast US and Left breast Mammogram on 12/15/2022 Deemed BIRADS category 4. Ms. JESSE BOB is s/p US guided left breast biopsy on 01/12/2023. 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 Invasive tumor measures at least 0.3 cm. 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 Microcalcifications and lymphovascular permeation by tumor are not seen. The pathology results are concordant with the imaging. [de-identified] : Patient reports no interval changes to her overall health status or medical history

## 2023-04-03 NOTE — PLAN
[FreeTextEntry1] : Ms. JESSE BOB  is presenting  today for an evaluation .  she is doing well and offers no complaints.  Results of  her recent  breast  MRI  and physical examination findings were discussed in details.   She  was advised to have   Right     breast MRI guided biopsy  and return after the tests. Importance of monthly self-breast examination was reinforced.  Patient's questions and concerns addressed to patient's satisfaction.\par \par  \par

## 2023-06-27 ENCOUNTER — APPOINTMENT (OUTPATIENT)
Dept: SURGICAL ONCOLOGY | Facility: CLINIC | Age: 78
End: 2023-06-27

## 2023-06-28 ENCOUNTER — APPOINTMENT (OUTPATIENT)
Dept: OBGYN | Facility: CLINIC | Age: 78
End: 2023-06-28
Payer: MEDICARE

## 2023-06-28 VITALS
DIASTOLIC BLOOD PRESSURE: 78 MMHG | TEMPERATURE: 97.7 F | SYSTOLIC BLOOD PRESSURE: 146 MMHG | WEIGHT: 139 LBS | HEIGHT: 61.5 IN | HEART RATE: 68 BPM | BODY MASS INDEX: 25.91 KG/M2 | OXYGEN SATURATION: 97 %

## 2023-06-28 DIAGNOSIS — C50.912 MALIGNANT NEOPLASM OF UNSPECIFIED SITE OF LEFT FEMALE BREAST: ICD-10-CM

## 2023-06-28 DIAGNOSIS — Z01.419 ENCOUNTER FOR GYNECOLOGICAL EXAMINATION (GENERAL) (ROUTINE) W/OUT ABNORMAL FINDINGS: ICD-10-CM

## 2023-06-28 DIAGNOSIS — N81.4 UTEROVAGINAL PROLAPSE, UNSPECIFIED: ICD-10-CM

## 2023-06-28 PROCEDURE — 99387 INIT PM E/M NEW PAT 65+ YRS: CPT | Mod: GY

## 2023-06-28 NOTE — HISTORY OF PRESENT ILLNESS
[FreeTextEntry1] : new patient \par here for annual\par has DCIS of the breast \par menopausal - denies pmb \par not sexually active \par hx of breast reduction and abdominoplasty

## 2024-12-19 NOTE — ED ADULT NURSE NOTE - CHIEF COMPLAINT
Detail Level: Simple Additional Notes: Bx if not improved with ilk. Hasn’t been bx before Render Risk Assessment In Note?: no The patient is a 74y Female complaining of headache.

## 2025-01-07 NOTE — PATIENT PROFILE ADULT. - NS PRO LAST MENSTRUAL DATE
Hematology/Medical Oncology Ambulatory Note      Patient Information     Patient Name:         Yuko Gunter   MRN:                       [id number]  YOB: 1954  70 year old   Encounter Date:     01/07/25      Patient Care Team:  Patient Care Team:  Sariah Jean-Baptiste MD as PCP - General (Family Practice)  Darren Disla DO as Specialist (Hematology & Oncology)  Wilson, Hang as Patient Navigator (Oncology)     Cancer Staging:    Cancer Staging   No matching staging information was found for the patient.       Interval History:  Patient presents in follow up today.  Since last visit she underwent a thoracentesis with removal of fluid and cytology was negative for malignancy.  Her breathing has improved.  She does complain of some right-sided rib pain where there is a pathologic fracture.  Her biopsies are not scheduled till November 5.  She no longer has any B symptoms.    November 12, 2024: Patient was recently in the hospital for SOB and found to have a large right sided effusion s/p thoracentesis with removal of 2L fluid.  Path was c/w b-cell lymphoma. 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     November 25, 2024:  Follow up for marginal zone lymphoma.  Patient was seen by pulmonology and does not have any active signs of TB but has been started on pyridoxine and INH as preventative measures.  She is ready to begin treatment tomorrow.    December 26, 2024: Follow up for marginal zone lymphoma. Here for cycle 2 of BR therapy.  Overall feeling better in this past week.  Did have some fatigue.  Daughter her to translate.  Pleurex draining about 650 daily.     January 7, 2025:  Follow up for marginal zone lymphoma. Here for cycle 2 of BR therapy. Since last visit she has more energy and better appetite.  Drainage from pleurex cath is decreasing to 500cc every 3rd day. ANC today is 0.9.  Denies any constitutional symptoms.          Past Medical, Surgical, Family and Social History  Past  Medical History:   Diagnosis Date    Arthritis     Blood clot associated with vein wall inflammation     right leg    Diabetes mellitus  (CMD)     borderline     Essential (primary) hypertension     no meds yet     Glaucoma     High cholesterol     Hip pain     History of total hip replacement 04/30/2021    R SHIRA    Lymphadenopathy     Malignant neoplasm  (CMD) 10/2024    lymphoma    Snores     Vertigo     mostly from quick chnage in position last iccur 11/1/24 appox        Past Surgical History:   Procedure Laterality Date    Eye surgery      laser for glaucoma nikko     Ir tunneled pleural catheter insertion      ICU admit 11/2024    Total hip replacement Bilateral 2019        History reviewed. No pertinent family history.     Social History     Socioeconomic History    Marital status:      Spouse name: Not on file    Number of children: Not on file    Years of education: Not on file    Highest education level: Not on file   Occupational History    Not on file   Tobacco Use    Smoking status: Never     Passive exposure: Never    Smokeless tobacco: Never   Vaping Use    Vaping status: never used   Substance and Sexual Activity    Alcohol use: No    Drug use: Never    Sexual activity: Not Currently     Partners: Declines to Answer   Other Topics Concern    Not on file   Social History Narrative    Not on file     Social Determinants of Health     Financial Resource Strain: Low Risk  (11/28/2024)    Financial Resource Strain     Unable to Get: None   Food Insecurity: Low Risk  (11/28/2024)    Food Insecurity     Worried about Food: Never true     Food is Gone: Never true   Transportation Needs: No Transportation Needs (12/8/2024)    OASIS : Transportation     Lack of Transportation (Medical): No     Lack of Transportation (Non-Medical): No     Patient Unable or Declines to Respond: No   Physical Activity: Inactive (4/26/2021)    Exercise Vital Sign     Days of Exercise per Week: 0 days     Minutes of  Exercise per Session: 0 min   Stress: Not on file   Social Connections: Feeling Socially Integrated (12/8/2024)    OASIS : Social Isolation     Frequency of experiencing loneliness or isolation: Rarely   Interpersonal Safety: Low Risk  (11/28/2024)    Interpersonal Safety     How often physically hurt: Never     How often insulted or talked down to: Never     How often threatened with harm: Never     How often scream or curse at: Never        Allergies and Adverse Drug Reactions  ALLERGIES:   Allergen Reactions    Latex Other (See Comments)     Redness, skin irritation        Medications  Current Outpatient Medications   Medication Sig Dispense Refill    escitalopram (LEXAPRO) 10 MG tablet Take 1 tablet by mouth daily. 90 tablet 0    apixaBAN (ELIQUIS) 5 MG Tab Take 1 tablet by mouth every 12 hours. Begin taking on December 7, 2024. 60 tablet 0    isoniazid (NYDRAZID) 300 MG tablet Take 1 tablet by mouth daily. 30 tablet 5    Pyridoxine HCl (Vitamin B6) 50 MG Tab Take 1 tablet by mouth daily. 30 tablet 5    sulfamethoxazole-trimethoprim (Bactrim DS) 800-160 MG per tablet Take 1 tablet by mouth 3 days a week. Take one tablet by mouth on Monday, Wednesday, Friday. 30 tablet 11    acyclovir (ZOVIRAX) 400 MG tablet Take 1 tablet by mouth in the morning and 1 tablet in the evening. 60 tablet 11    prochlorperazine (COMPAZINE) 10 MG tablet Take 1 tablet by mouth every 6 hours as needed for Nausea or Vomiting. (Patient not taking: Reported on 1/7/2025) 30 tablet 5    ondansetron (ZOFRAN) 8 MG tablet Take 1 tablet by mouth in the morning and 1 tablet in the evening. Take for 2 days post chemotherapy. (Patient not taking: Reported on 1/7/2025) 30 tablet 0    acetaminophen (TYLENOL) 500 MG tablet Take 500 mg by mouth as needed for Pain.      metFORMIN (GLUCOPHAGE) 500 MG tablet Take 1 tablet by mouth daily (with breakfast). Labs due please call office to schedule 90 tablet 1    latanoprost (XALATAN) 0.005 % ophthalmic  solution Place 1 drop into both eyes nightly.       No current facility-administered medications for this visit.     Facility-Administered Medications Ordered in Other Visits   Medication Dose Route Frequency Provider Last Rate Last Admin    sodium chloride 0.9 % injection 10 mL  10 mL Intracatheter PRN Darren Disla DO            Subjective     Review of Systems:  Review of Systems   Constitutional:  Negative for activity change, appetite change, chills, fatigue and fever.   HENT:  Negative for ear pain, mouth sores, sore throat, tinnitus, trouble swallowing and voice change.    Eyes:  Negative for pain, redness and visual disturbance.   Respiratory:  Negative for cough, chest tightness, shortness of breath, wheezing and stridor.    Cardiovascular:  Negative for chest pain, palpitations and leg swelling.   Gastrointestinal:  Negative for abdominal distention, abdominal pain, constipation, diarrhea, nausea and vomiting.   Genitourinary:  Negative for dysuria, frequency, hematuria and urgency.   Musculoskeletal:  Negative for arthralgias, back pain, joint swelling and myalgias.   Skin:  Negative for rash.   Neurological:  Negative for weakness, numbness and headaches.   Hematological:  Negative for adenopathy. Does not bruise/bleed easily.           Objective     Weight 114 pounds, /68    Physical Exam  Constitutional:       Appearance: Normal appearance.   HENT:      Head: Normocephalic and atraumatic.      Nose: Nose normal.      Mouth/Throat:      Mouth: Mucous membranes are moist.      Neck: Neck supple.   Eyes:      Extraocular Movements: Extraocular movements intact.      Conjunctiva/sclera: Conjunctivae normal.      Pupils: Pupils are equal, round, and reactive to light.   Cardiovascular:      Rate and Rhythm: Normal rate and regular rhythm.      Heart sounds: Normal heart sounds.      Comments: Bilateral palpable axillary lymphadenopathy  Pulmonary:      Effort: Pulmonary effort is normal.      Breath  sounds: Normal breath sounds.      Comments: Decreased breath sounds on the right  Pleurex catheter placed  Abdominal:      General: Bowel sounds are normal.      Palpations: Abdomen is soft.   Lymphadenopathy:      Cervical: No cervical adenopathy.   Skin:     General: Skin is warm and dry.   Neurological:      Mental Status: She is alert and oriented to person, place, and time.   Psychiatric:         Mood and Affect: Mood normal.          PERFORMANCE STATUS:  ECOG [01/07/25 0941]   ECOG Performance Status 1     WBC (K/mcL)   Date Value   01/07/2025 2.2 (L)     RBC (mil/mcL)   Date Value   01/07/2025 2.67 (L)     HCT (%)   Date Value   01/07/2025 26.0 (L)     HGB (g/dL)   Date Value   01/07/2025 8.3 (L)     PLT (K/mcL)   Date Value   01/07/2025 235          TREATMENT PLAN:  Bendamustine 90mg/m2 on days 1 and 2  Rituxan 375mg/m2 on day 1  Every 28 days for 6 cycles    Due to neutropenia will dose reduced bendamustine to 70mg/m2 and add neulasta      IMAGING:    IMPRESSION:  1. Extensive hypermetabolic lymphadenopathy both above and below the  diaphragm suspicious for lymphoproliferative/neoplastic involvement.  2. Hypermetabolic anterior mediastinal density suspicious for conglomerate  lymph node.  3. Moderate intraluminal FDG uptake within the gastric lumen, for which EGD  recommended to assess for underlying gastric involvement.  4. Hypermetabolic subtle osseous lesions suspicious for neoplastic  involvement. Possible subtle nondisplaced pathologic fractures of right  ribs.  5. Small volume free fluid in the lower pelvis with mild FDG uptake.  6. Additional ancillary findings as detailed above.    PATHOLOGY:    LYMPHOMA PANEL:  CD2, CD3, CD4, CD5, CD7, CD8, CD10, CD19, CD20, CD23, CD45, CD56/CD57, FMC7, [place], Lambda  HAIRY CELL TUBE: CD11c, CD19, CD45,      GATING: Lymphocyte region (bright CD45, low side scatter)     FINDINGS: Flow cytometry analysis of the peripheral blood shows the lymphocyte region to  contain 43% of total events with an inverted T-cell to B-cell ratio of 1 to 4.6. There is a clonal B-cell population (36% of total events) with the following phenotype: CD45+(bright), low SSC, CD20+(bright), CD19+, CD5-, CD23-, FMC7+, CD10-, CD11c-, -, lambda light chain restricted. T-cells (6% of total events) show a CD4 to CD8 ratio of 1.5 to 1 and have no significant antigenic abnormalities by markers assayed. This assay was not designed to detect plasma cell abnormalities.     IMPRESSION: Phenotypic evidence of a clonal B-cell population (CD5-, CD23-, FMC7+, CD10-, lambda restricted). Correlation with clinical, laboratory, genetic, and morphologic findings is recommended for further hematologic evaluation.    Pleural fluid, right (ThinPrep slide and cellblock):   -Negative for malignancy  -Few reactive mesothelial cells, scattered histiocytes, fibrin, red blood cells and scattered lymphocytes/neutrophils      Pleural fluid:  IMPRESSION: Phenotypic evidence of a mature B-cell neoplasm (CD5-, CD23-, CD10-). Correlation with clinical, laboratory, and morphologic findings is recommended.       A-C. Peripheral blood, right iliac crest core biopsy, and left iliac crest aspirate/clot:  -  Peripheral blood showing an absolute lymphocytosis with atypical lymphocytes, and a mild normocytic anemia  -  Hypercellular marrow for age showing interstitial aggregates/sheets of atypical lymphocytes (90% cellular; 40-50% B-cells, 7% plasma cells), consistent with involvement by a mature B-cell neoplasm (CD5-, CD10-) with a lambda restricted plasma cell component  - Marrow with mildly reduced quantity of maturing trilineage hematopoiesis with a relative granulocytic hyperplasia, normoblastic erythropoiesis, and reduced stainable iron  (see microscopic description and comment)    Morphologic findings are compatible with peripheral blood and bone marrow involvement by a mature B-cell neoplasm (CD5-, CD10-) lambda light chain  restricted plasma cell component. The differential diagnosis includes marginal zone lymphoma and lymphoplasmacytic lymphoma. Correlation with clinical, laboratory, imaging, and genetic findings is recommended. For additional pathologic information (including results of MYD88 gene mutation analysis) refer to the liver biopsy specimen ([id number]). This case was interpreted at [facility], [address] [place] [address]       ASSESSMENT:    [place] marginal zone lymphoma.   [place] stage IV disease.  Reviewed all pathology with the patient and daughter.  Indicated patients are not usually cured with conventional treatment, however goal of treatment is to try and achieve a remission.  Did indicate repeated relapses are common.  Treatment focuses on alleviation of symptoms, reversal of cytopenias and improvement in quality of life.  She has severly symptomatic bulky disease and therefore recommending the use of combination therapy with Bendamustine and Rituxan.  Median PFS was 57 months and overall response rate was approx 92%.  This regimen when compared to multi chemo regimens resulted in equivalent efficacy and less toxicity.  (Lancet 2013) Side effects of the treatment were reviewed.  Patient tolerated cycle 1 well.  Clinically, patient feels better.  Will have to continue to hold cycle 2 due to unacceptable neutropenia.  Return next week for repeat CBC and if parameters met will receive cycle 2 with dose reduction of bendamustine.  In addition, will add neulasta to her regimen.  Patient and daughter understand and agree with the plan.       Malignant right-sided pleural effusion.  S/p pleurex catheter. Decrease in amount of drainage.  Draining about 500cc every third day.       Tuberculosis.  Follows with pulmonology.  Positive QuantiFERON, latent TB with no evidence for active TB clinically.  Continue preventative isoniazid and vitamin B6.    DVT, right posterior tibial veins.  (11/27/24).   Provoked by malignancy.  Currently on apixaban 5mg BID.          PLAN:  Cycle 2 delayed until next week  Continue neutropenic precautions  RTC 1 week for potential treatment and 5 weeks for OV and cycle 3        Total time: 20 minutes with greater than 50% of the time counseling and educating the patient           not applicable

## 2025-04-28 ENCOUNTER — RESULT REVIEW (OUTPATIENT)
Age: 80
End: 2025-04-28

## 2025-04-28 ENCOUNTER — OUTPATIENT (OUTPATIENT)
Dept: OUTPATIENT SERVICES | Facility: HOSPITAL | Age: 80
LOS: 1 days | End: 2025-04-28
Payer: COMMERCIAL

## 2025-04-28 DIAGNOSIS — Z98.890 OTHER SPECIFIED POSTPROCEDURAL STATES: Chronic | ICD-10-CM

## 2025-04-28 DIAGNOSIS — R06.02 SHORTNESS OF BREATH: ICD-10-CM

## 2025-04-28 DIAGNOSIS — Z95.9 PRESENCE OF CARDIAC AND VASCULAR IMPLANT AND GRAFT, UNSPECIFIED: Chronic | ICD-10-CM

## 2025-04-28 PROCEDURE — 78452 HT MUSCLE IMAGE SPECT MULT: CPT | Mod: MC

## 2025-04-28 PROCEDURE — 93017 CV STRESS TEST TRACING ONLY: CPT

## 2025-04-28 PROCEDURE — A9502: CPT

## 2025-05-08 NOTE — ED ADULT NURSE NOTE - CADM POA CENTRAL LINE
Follow up with primary care physician in the next 7 days or sooner if needed. If you do not have a Primary care physician, please schedule an appointment with one. Please ask prior to discharge about a list of local providers.     Please return to ER or call 911 if you develop any significant signs or symptoms.    I may not have addressed all of your medical illnesses or the abnormal blood work or imaging therefore please ask your PCP to obtain [facility] record to follow up on all of the abnormal labs, imaging and findings that I have and have not addressed during your hospitalization.     Discharging you from the hospital does not mean that your medical care ends here and now. You may still need additional work up, investigation, monitoring, and treatment to be handled from this point on by outside providers including your PCP, Specialists and other healthcare providers.     For medication questions, contact your retail pharmacy and your PCP.    Your medical team at [facility] appreciates the opportunity to work with you to get well!    Joseph Box,   7:02 AM  
No
